# Patient Record
Sex: FEMALE | Race: BLACK OR AFRICAN AMERICAN | NOT HISPANIC OR LATINO | URBAN - METROPOLITAN AREA
[De-identification: names, ages, dates, MRNs, and addresses within clinical notes are randomized per-mention and may not be internally consistent; named-entity substitution may affect disease eponyms.]

---

## 2024-07-22 ENCOUNTER — EMERGENCY (EMERGENCY)
Facility: HOSPITAL | Age: 24
LOS: 0 days | Discharge: HOME | End: 2024-07-22
Attending: STUDENT IN AN ORGANIZED HEALTH CARE EDUCATION/TRAINING PROGRAM
Payer: COMMERCIAL

## 2024-07-22 VITALS
OXYGEN SATURATION: 98 % | TEMPERATURE: 99 F | HEART RATE: 69 BPM | WEIGHT: 149.91 LBS | DIASTOLIC BLOOD PRESSURE: 75 MMHG | SYSTOLIC BLOOD PRESSURE: 116 MMHG | RESPIRATION RATE: 18 BRPM | HEIGHT: 67 IN

## 2024-07-22 DIAGNOSIS — O99.891 OTHER SPECIFIED DISEASES AND CONDITIONS COMPLICATING PREGNANCY: ICD-10-CM

## 2024-07-22 DIAGNOSIS — O21.9 VOMITING OF PREGNANCY, UNSPECIFIED: ICD-10-CM

## 2024-07-22 DIAGNOSIS — J45.909 UNSPECIFIED ASTHMA, UNCOMPLICATED: ICD-10-CM

## 2024-07-22 DIAGNOSIS — R10.32 LEFT LOWER QUADRANT PAIN: ICD-10-CM

## 2024-07-22 DIAGNOSIS — O23.41 UNSPECIFIED INFECTION OF URINARY TRACT IN PREGNANCY, FIRST TRIMESTER: ICD-10-CM

## 2024-07-22 DIAGNOSIS — Z3A.01 LESS THAN 8 WEEKS GESTATION OF PREGNANCY: ICD-10-CM

## 2024-07-22 LAB
ALBUMIN SERPL ELPH-MCNC: 5.1 G/DL — SIGNIFICANT CHANGE UP (ref 3.5–5.2)
ALP SERPL-CCNC: 60 U/L — SIGNIFICANT CHANGE UP (ref 30–115)
ALT FLD-CCNC: 9 U/L — SIGNIFICANT CHANGE UP (ref 0–41)
ANION GAP SERPL CALC-SCNC: 12 MMOL/L — SIGNIFICANT CHANGE UP (ref 7–14)
APPEARANCE UR: ABNORMAL
AST SERPL-CCNC: 15 U/L — SIGNIFICANT CHANGE UP (ref 0–41)
BASOPHILS # BLD AUTO: 0.06 K/UL — SIGNIFICANT CHANGE UP (ref 0–0.2)
BASOPHILS NFR BLD AUTO: 0.5 % — SIGNIFICANT CHANGE UP (ref 0–1)
BILIRUB SERPL-MCNC: 1.1 MG/DL — SIGNIFICANT CHANGE UP (ref 0.2–1.2)
BILIRUB UR-MCNC: NEGATIVE — SIGNIFICANT CHANGE UP
BLD GP AB SCN SERPL QL: SIGNIFICANT CHANGE UP
BUN SERPL-MCNC: 8 MG/DL — LOW (ref 10–20)
CALCIUM SERPL-MCNC: 9.6 MG/DL — SIGNIFICANT CHANGE UP (ref 8.4–10.5)
CHLORIDE SERPL-SCNC: 102 MMOL/L — SIGNIFICANT CHANGE UP (ref 98–110)
CO2 SERPL-SCNC: 23 MMOL/L — SIGNIFICANT CHANGE UP (ref 17–32)
COLOR SPEC: YELLOW — SIGNIFICANT CHANGE UP
CREAT SERPL-MCNC: 0.8 MG/DL — SIGNIFICANT CHANGE UP (ref 0.7–1.5)
DIFF PNL FLD: ABNORMAL
EGFR: 105 ML/MIN/1.73M2 — SIGNIFICANT CHANGE UP
EOSINOPHIL # BLD AUTO: 0.06 K/UL — SIGNIFICANT CHANGE UP (ref 0–0.7)
EOSINOPHIL NFR BLD AUTO: 0.5 % — SIGNIFICANT CHANGE UP (ref 0–8)
GLUCOSE SERPL-MCNC: 86 MG/DL — SIGNIFICANT CHANGE UP (ref 70–99)
GLUCOSE UR QL: NEGATIVE MG/DL — SIGNIFICANT CHANGE UP
HCG SERPL-ACNC: 270.2 MIU/ML — HIGH
HCT VFR BLD CALC: 42.7 % — SIGNIFICANT CHANGE UP (ref 37–47)
HGB BLD-MCNC: 14.1 G/DL — SIGNIFICANT CHANGE UP (ref 12–16)
IMM GRANULOCYTES NFR BLD AUTO: 0.3 % — SIGNIFICANT CHANGE UP (ref 0.1–0.3)
KETONES UR-MCNC: ABNORMAL MG/DL
LEUKOCYTE ESTERASE UR-ACNC: ABNORMAL
LIDOCAIN IGE QN: 26 U/L — SIGNIFICANT CHANGE UP (ref 7–60)
LYMPHOCYTES # BLD AUTO: 18.8 % — LOW (ref 20.5–51.1)
LYMPHOCYTES # BLD AUTO: 2.08 K/UL — SIGNIFICANT CHANGE UP (ref 1.2–3.4)
MCHC RBC-ENTMCNC: 30.5 PG — SIGNIFICANT CHANGE UP (ref 27–31)
MCHC RBC-ENTMCNC: 33 G/DL — SIGNIFICANT CHANGE UP (ref 32–37)
MCV RBC AUTO: 92.2 FL — SIGNIFICANT CHANGE UP (ref 81–99)
MONOCYTES # BLD AUTO: 0.52 K/UL — SIGNIFICANT CHANGE UP (ref 0.1–0.6)
MONOCYTES NFR BLD AUTO: 4.7 % — SIGNIFICANT CHANGE UP (ref 1.7–9.3)
NEUTROPHILS # BLD AUTO: 8.33 K/UL — HIGH (ref 1.4–6.5)
NEUTROPHILS NFR BLD AUTO: 75.2 % — SIGNIFICANT CHANGE UP (ref 42.2–75.2)
NITRITE UR-MCNC: POSITIVE
NRBC # BLD: 0 /100 WBCS — SIGNIFICANT CHANGE UP (ref 0–0)
PH UR: 6.5 — SIGNIFICANT CHANGE UP (ref 5–8)
PLATELET # BLD AUTO: 244 K/UL — SIGNIFICANT CHANGE UP (ref 130–400)
PMV BLD: 11.2 FL — HIGH (ref 7.4–10.4)
POTASSIUM SERPL-MCNC: 3.8 MMOL/L — SIGNIFICANT CHANGE UP (ref 3.5–5)
POTASSIUM SERPL-SCNC: 3.8 MMOL/L — SIGNIFICANT CHANGE UP (ref 3.5–5)
PROT SERPL-MCNC: 7.4 G/DL — SIGNIFICANT CHANGE UP (ref 6–8)
PROT UR-MCNC: SIGNIFICANT CHANGE UP MG/DL
RBC # BLD: 4.63 M/UL — SIGNIFICANT CHANGE UP (ref 4.2–5.4)
RBC # FLD: 12.8 % — SIGNIFICANT CHANGE UP (ref 11.5–14.5)
SODIUM SERPL-SCNC: 137 MMOL/L — SIGNIFICANT CHANGE UP (ref 135–146)
SP GR SPEC: 1.02 — SIGNIFICANT CHANGE UP (ref 1–1.03)
UROBILINOGEN FLD QL: 1 MG/DL — SIGNIFICANT CHANGE UP (ref 0.2–1)
WBC # BLD: 11.08 K/UL — HIGH (ref 4.8–10.8)
WBC # FLD AUTO: 11.08 K/UL — HIGH (ref 4.8–10.8)

## 2024-07-22 PROCEDURE — 84702 CHORIONIC GONADOTROPIN TEST: CPT

## 2024-07-22 PROCEDURE — 87186 SC STD MICRODIL/AGAR DIL: CPT

## 2024-07-22 PROCEDURE — 99284 EMERGENCY DEPT VISIT MOD MDM: CPT | Mod: 25

## 2024-07-22 PROCEDURE — 83690 ASSAY OF LIPASE: CPT

## 2024-07-22 PROCEDURE — 76830 TRANSVAGINAL US NON-OB: CPT | Mod: 26

## 2024-07-22 PROCEDURE — 87086 URINE CULTURE/COLONY COUNT: CPT

## 2024-07-22 PROCEDURE — 76830 TRANSVAGINAL US NON-OB: CPT

## 2024-07-22 PROCEDURE — 36000 PLACE NEEDLE IN VEIN: CPT

## 2024-07-22 PROCEDURE — 85025 COMPLETE CBC W/AUTO DIFF WBC: CPT

## 2024-07-22 PROCEDURE — 86900 BLOOD TYPING SEROLOGIC ABO: CPT

## 2024-07-22 PROCEDURE — 86850 RBC ANTIBODY SCREEN: CPT

## 2024-07-22 PROCEDURE — 86901 BLOOD TYPING SEROLOGIC RH(D): CPT

## 2024-07-22 PROCEDURE — 80053 COMPREHEN METABOLIC PANEL: CPT

## 2024-07-22 PROCEDURE — 36415 COLL VENOUS BLD VENIPUNCTURE: CPT

## 2024-07-22 PROCEDURE — 99285 EMERGENCY DEPT VISIT HI MDM: CPT

## 2024-07-22 PROCEDURE — 81001 URINALYSIS AUTO W/SCOPE: CPT

## 2024-07-22 RX ORDER — CEPHALEXIN 500 MG
1 CAPSULE ORAL
Qty: 20 | Refills: 0
Start: 2024-07-22 | End: 2024-07-31

## 2024-07-22 NOTE — ED PROVIDER NOTE - CARE PLAN
Principal Discharge DX:	Acute UTI   1 Principal Discharge DX:	Acute UTI  Assessment and plan of treatment:	plan- labs tvus ua

## 2024-07-22 NOTE — CONSULT NOTE ADULT - ASSESSMENT
24y  at 5w0d with likely early IUP, PUL, clinically stable and asmptomatic.    -ectopic and bleeding precautions discussed  -advised repeat hCG in 48hrs, paper scripts given  -patient reports she would not like to proceed with pregnancy and desires termination, advised follow up at [facility] for furhter care  -dispo per ED

## 2024-07-22 NOTE — ED PROVIDER NOTE - NSFOLLOWUPCLINICS_GEN_ALL_ED_FT
[facility]  OB/GYN  [address] [place] [address]  Phone: [phone]  Fax:

## 2024-07-22 NOTE — ED PROVIDER NOTE - PHYSICAL EXAMINATION
VITAL SIGNS: I have reviewed nursing notes and confirm.  CONSTITUTIONAL: Well-developed; well-nourished; in no acute distress.  SKIN: Skin exam is warm and dry, no acute rash.  HEAD: Normocephalic; atraumatic.  CARD: S1, S2 normal; no murmurs, gallops, or rubs. Regular rate and rhythm.  RESP: Normal respiratory effort, no tachypnea or distress. Lungs CTAB, no wheezes, rales or rhonchi.  ABD: soft, tender suprapubic region and RUQ  NEURO: Alert, oriented. Grossly unremarkable. No focal deficits.  PSYCH: Cooperative, appropriate.

## 2024-07-22 NOTE — ED ADULT TRIAGE NOTE - CHIEF COMPLAINT QUOTE
pt c/o cramps and urinary frequency x2 weeks  denies vaginal bleeding   states she is 5 weeks preg  LMP 6/17

## 2024-07-22 NOTE — ED PROVIDER NOTE - NSFOLLOWUPINSTRUCTIONS_ED_ALL_ED_FT
Our Emergency Department Referral Coordinators will be reaching out to you in the next 24-48 hours from 9:00am to 5:00pm to schedule a follow up appointment. Please expect a phone call from the hospital in that time frame. If you do not receive a call or if you have any questions or concerns, you can reach them at   [phone].    Pregnancy and Urinary Tract Infection  The female urinary system, including the kidneys, ureters, bladder, and urethra.  A urinary tract infection (UTI) is an infection of any part of the urinary tract. This includes the kidneys, the tubes that connect the kidneys to the bladder (ureters), the bladder, and the tube that carries urine out of the body (urethra). These organs make, store, and get rid of urine in the body. Your health care provider may use other names to describe the infection. An upper UTI affects the ureters and kidneys (pyelonephritis). A lower UTI affects the bladder (cystitis) and urethra (urethritis).    Most UTIs are caused by bacteria in the genital area, around the entrance to the urinary tract. These bacteria grow and cause irritation and inflammation of the urinary tract. You are more likely to develop a UTI during pregnancy because:  The physical and hormonal changes that your body goes through make it easier for bacteria to get into your urinary tract.  Your growing baby puts pressure on your bladder and can affect urine flow.  Pregnant women with diabetes are at an increased risk for developing a UTI. It is important to recognize and treat UTIs in pregnancy because they can cause serious complications for both you and your baby.    How does this affect me?  Symptoms of a UTI include:  Needing to urinate right away (urgently) and often, even if urinating a small amount.  Pain, burning, or having a hard time passing urine.  Blood in the urine.  Unusual, cloudy, and bad-smelling urine.  Pain in the abdomen or lower back.  Vaginal discharge.  You may also have:  Vomiting or a decreased appetite.  Confusion.  Irritability or tiredness.  A fever.  Diarrhea.  A low level of red blood cells (anemia).  The development of high blood pressure during pregnancy (preeclampsia).  How does this affect my baby?  An untreated UTI during pregnancy could lead to a kidney infection or an infection throughout the mother's body (systemic infection). This can cause health problems and affect the baby. Possible complications of an untreated UTI include:  Your baby being born before 37 weeks of pregnancy (premature).  Your baby being born with a low birth weight.  Your baby having a higher risk of having his or her skin or the white parts of the eyes turn yellow (jaundice).  What can I do to lower my risk?  To prevent a UTI:  Do not hold urine for long periods of time. Empty your bladder as soon as you feel the urge.  Always wipe from front to back, especially after a bowel movement. Use each tissue one time when you wipe.  Empty your bladder after sex.  Keep your genital area dry.  Drink 6 to 8 glasses of water each day.  Do not douche or use deodorant sprays.  Wear cotton underwear and loose clothing.  How is this treated?  Treatment for this condition may include:  Antibiotic medicines that are safe to take during pregnancy.  Other medicines to treat less common causes of UTI.  Follow these instructions at home:  If you were prescribed an antibiotic medicine, take it as told by your health care provider. Do not stop using the antibiotic even if you start to feel better.  Keep all follow-up visits. This is important.  Contact a health care provider if:  Your symptoms do not improve or they get worse.  You have abnormal vaginal discharge.  Get help right away if you:  Have a fever.  Have nausea and vomiting.  Have back or side pain.  Have lower belly pain, tightness, or feel contractions in your uterus.  Have a gush of fluid from your vagina.  Have blood in your urine.  Summary  A UTI is an infection of any part of the urinary tract, which includes the kidneys, ureters, bladder, and urethra.  Most urinary tract infections are caused by bacteria in your genital area, around the entrance to your urinary tract (urethra).  You are more likely to develop a UTI during pregnancy. It is important to recognize and treat UTIs in pregnancy because of the risk of serious complications for both you and your baby.  If you were prescribed an antibiotic medicine, take it as told by your health care provider. Do not stop using the antibiotic even if you start to feel better.  This information is not intended to replace advice given to you by your health care provider. Make sure you discuss any questions you have with your health care provider.

## 2024-07-22 NOTE — ED PROVIDER NOTE - ATTENDING CONTRIBUTION TO CARE
25 yo F PMHx miscarriages, asthma presents to ED for eval of RLQ abdominal pain for 3 days, currently 5 weeks pregnant, reports urinary frequency. No fevers. LMP 6/17/24.     CONSTITUTIONAL: NAD.   SKIN: warm, dry  HEAD: Normocephalic; atraumatic.  ENT: MMM.   NECK: Supple.  CARD: RRR.   RESP: No wheezes, rales or rhonchi.  ABD: soft nondistended mild suprapubic tenderess no CVAT.   EXT: Normal ROM.  No lower extremity edema.   NEURO: Alert, oriented, grossly unremarkable.

## 2024-07-22 NOTE — ED PROVIDER NOTE - NSPTACCESSSVCSAPPTDETAILS_ED_ALL_ED_FT
assist to open containers. Pt reports difficulty with opening bags at home/set-up required 5w pregnant

## 2024-07-22 NOTE — CONSULT NOTE ADULT - SUBJECTIVE AND OBJECTIVE BOX
Chief Complaint: +hCG    HPI: 24y  at 5w0d by LMP  with + HCG. Denies abdominal pain or bleeding. This is an unplanned and undesired pregnancy. She does not see a GYN.     Ob/Gyn History:                   LMP -   SABx2 at "3 months" pregnancy  Denies history of ovarian cysts, uterine fibroids, abnormal paps, or STIs    Home Medications: denies    Allergies: NKDA    PAST MEDICAL & SURGICAL HISTORY:  Asthma    FAMILY HISTORY: denies pertinent hx      SOCIAL HISTORY: Denies cigarette use, alcohol use, or illicit drug use    Vital Signs Last 24 Hrs  T(F): 98.8 (2024 12:09), Max: 98.8 (2024 12:09)  HR: 69 (2024 12:09) (69 - 69)  BP: 116/75 (2024 12:09) (116/75 - 116/75)  RR: 18 (2024 12:09) (18 - 18)  Height (cm): 170.2 (24 @ 12:09)  Weight (kg): 68 (24 @ 12:09)  BMI (kg/m2): 23.5 (24 @ 12:09)  BSA (m2): 1.79 (24 @ 12:09)    General Appearance - AAOx3, NAD  Heart - S1S2 regular rate and rhythm  Lung - CTA Bilaterally  Abdomen - Soft, nontender, nondistended, no rebound, no rigidity, no guarding, bowel sounds present    GYN/Pelvis:    Labia Majora - Normal  Labia Minora - Normal  Clitoris - Normal  Urethra - Normal  Vagina - Normal  Cervix - Normal    Uterus:  Size - Normal  Tenderness - None  Mass - None  Freely mobile    Adnexa:  Masses - None  Tenderness - None      Meds:     Height (cm): 170.2 (24 @ 12:09)  Weight (kg): 68 (24 @ 12:09)  BMI (kg/m2): 23.5 (24 @ 12:09)  BSA (m2): 1.79 (24 @ 12:09)    LABS:                        14.1   11.08 )-----------( 244      ( 2024 13:33 )             42.7     HCG Quantitative, Serum: 270.2 mIU/mL (24 @ 13:33)    ABO RH Interpretation: A POS [2024 16:10  SHABBIR  No historical record of blood group and Rh, requires a second sample for  retype prior to the release of any blood products.  For prenatal, a  second sample on next visit.] (24 @ 15:17)  Antibody Screen: NEG (24 @ 15:17)        137  |  102  |  8<L>  ----------------------------<  86  3.8   |  23  |  0.8    Ca    9.6      2024 13:33    TPro  7.4  /  Alb  5.1  /  TBili  1.1  /  DBili  x   /  AST  15  /  ALT  9   /  AlkPhos  60        Urinalysis Basic - ( 2024 13:33 )    Color: Yellow / Appearance: Cloudy / S.020 / pH: x  Gluc: 86 mg/dL / Ketone: Trace mg/dL  / Bili: Negative / Urobili: 1.0 mg/dL   Blood: x / Protein: Trace mg/dL / Nitrite: Positive   Leuk Esterase: Moderate / RBC: 2 /HPF / WBC 19 /HPF   Sq Epi: x / Non Sq Epi: 10 /HPF / Bacteria: Too Numerous to count /HPF          RADIOLOGY & ADDITIONAL STUDIES:  < from: US Transvaginal (24 @ 14:03) >  FINDINGS:  Uterus: 8.5 x 3.7 x 4.7 cm. Endometrium measures 14 mm in thickness. No   intrauterine gestational sac identified.      Right ovary: 3.2 x 1.6 x 2.3 cm. Vascular flow demonstrated to the ovary.  Left ovary: 3.6 x 2.2 x 2.8 cm. Vascular flow demonstrated to the ovary.   2.3 cm corpus luteum.    Fluid: Small    IMPRESSION:    No intrauterine gestational sac. In a pregnant patient, these findings   represent a pregnancy of unknown location.The sonographic differential   diagnosis includes: an intrauterine gestation too early to visualize, a   spontaneous , or an occult ectopic pregnancy. Continued close   clinical follow-up, serial serum beta-HCG levels and short term   sonographic follow up is recommended    Small free pelvic fluid.    < end of copied text >

## 2024-07-22 NOTE — ED PROVIDER NOTE - DIFFERENTIAL DIAGNOSIS
The differential diagnosis for patients clinical presentation includes but is not limited to: pregnancy, uti, pyelo, miscarriage, ectopic Differential Diagnosis

## 2024-07-22 NOTE — ED PROVIDER NOTE - CLINICAL SUMMARY MEDICAL DECISION MAKING FREE TEXT BOX
25 yo F presented to ED for pregnancy eval with uti sx. Labs were ordered and reviewed.  Imaging was ordered and reviewed by me.  Appropriate medications for patient's presenting complaints were ordered and effects were reassessed.  Patient's records (prior hospital, ED visit, and/or nursing home notes if available) were reviewed.  Additional history was obtained from EMS, family, and/or PCP (where available).  Escalation to admission/observation was considered.   However patient feels much better and is comfortable with discharge.  Appropriate follow-up was arranged. Eval by obgyn in ED, will f/u for rpt beta hcg. Return precautions discussed in detail.

## 2024-07-22 NOTE — ED PROVIDER NOTE - PATIENT PORTAL LINK FT
You can access the FollowMyHealth Patient Portal offered by [facility] by registering at the following website: [email or web address]. By joining [facility]’s FollowMyHealth portal, you will also be able to view your health information using other applications (apps) compatible with our system.

## 2024-07-23 NOTE — CHART NOTE - NSCHARTNOTEFT_GEN_A_CORE
PGY2    Called patient to check in and remind [place] [facility] [place] completed [place]/24 at any [facility] lab. No response, VMx1 left with callback number provided.     Discussed with Dr. Adams.

## 2024-07-24 PROBLEM — J45.909 UNSPECIFIED ASTHMA, UNCOMPLICATED: Chronic | Status: ACTIVE | Noted: 2024-07-22

## 2024-07-24 NOTE — CHART NOTE - NSCHARTNOTEFT_GEN_A_CORE
PGY2    Called patient to check in and remind of [facility] lab work to be completed today. VMx1 left for patient. Able to contact emergency contact (older sister), Nia Conrad, who affirmed patient was working. Planning to go with her sister to the lab today at 1400. Instructed to go to any [facility] lab. Ectopic and bleeding precautions discussed. Will f/u results and contact patient.     Discussed with Dr. Adams.

## 2024-07-25 NOTE — CHART NOTE - NSCHARTNOTEFT_GEN_A_CORE
PGY2    Called patient to check in and discuss bhcg results. No response, VMx1 with callback instructions provided.     Discussed with Dr. Adams. PGY2    Called patient to check in and discuss bhcg results. No response, VMx1 with callback instructions provided.     Discussed with Dr. Adams.    Addendum: Patient returned phone call. Discussed appropriate bhcg rise. She is unsure about pregnancy at this time. Affirmed she will repeat bhcg 7/26, to discuss options counseling after subsequent bhcg.

## 2024-07-26 RX ORDER — NITROFURANTOIN MACROCRYSTAL 100 MG
1 CAPSULE ORAL
Qty: 14 | Refills: 0
Start: 2024-07-26 | End: 2024-08-01

## 2024-07-27 NOTE — CHART NOTE - NSCHARTNOTEFT_GEN_A_CORE
PGY2 Beta Note    Attempted to call pt to discuss appropriately rising HCG results and options counseling. Patient did not answer, VM x1 left and callback number provided.

## 2024-07-28 NOTE — CHART NOTE - NSCHARTNOTEFT_GEN_A_CORE
PGY2 Beta Note    Attempted to call pt to discuss appropriately rising HCG results and options counseling. Patient did not answer, VM x1 left and callback number provided. PGY2 Beta Note    Attempted to call pt to discuss appropriately rising HCG results and options counseling. Patient did not answer, VM x1 left and callback number provided.      ADDENDUM: Pt called back, not c/o abdominal pain or bleeding. Still desires TOP. Currently has appt with Dr. Carrizales for sono. Will reschedule for sono and TOP appt with Dr. Adams

## 2024-07-30 ENCOUNTER — APPOINTMENT (OUTPATIENT)
Dept: OBGYN | Facility: CLINIC | Age: 24
End: 2024-07-30

## 2024-08-01 ENCOUNTER — OUTPATIENT (OUTPATIENT)
Dept: OUTPATIENT SERVICES | Facility: HOSPITAL | Age: 24
LOS: 1 days | End: 2024-08-01
Payer: COMMERCIAL

## 2024-08-01 ENCOUNTER — APPOINTMENT (OUTPATIENT)
Dept: OBGYN | Facility: CLINIC | Age: 24
End: 2024-08-01

## 2024-08-01 VITALS
BODY MASS INDEX: 23.73 KG/M2 | SYSTOLIC BLOOD PRESSURE: 120 MMHG | WEIGHT: 153 LBS | DIASTOLIC BLOOD PRESSURE: 79 MMHG | OXYGEN SATURATION: 100 % | HEIGHT: 67.5 IN | HEART RATE: 80 BPM

## 2024-08-01 DIAGNOSIS — Z00.00 ENCOUNTER FOR GENERAL ADULT MEDICAL EXAMINATION W/OUT ABNORMAL FINDINGS: ICD-10-CM

## 2024-08-01 DIAGNOSIS — Z64.0 PROBLEMS RELATED TO UNWANTED PREGNANCY: ICD-10-CM

## 2024-08-01 PROCEDURE — 87661 TRICHOMONAS VAGINALIS AMPLIF: CPT

## 2024-08-01 PROCEDURE — 99205 OFFICE O/P NEW HI 60 MIN: CPT

## 2024-08-01 PROCEDURE — 76815 OB US LIMITED FETUS(S): CPT | Mod: 26

## 2024-08-01 PROCEDURE — 87491 CHLMYD TRACH DNA AMP PROBE: CPT

## 2024-08-01 PROCEDURE — 99215 OFFICE O/P EST HI 40 MIN: CPT

## 2024-08-01 PROCEDURE — 87591 N.GONORRHOEAE DNA AMP PROB: CPT

## 2024-08-01 RX ORDER — MISOPROSTOL 200 UG/1
200 TABLET ORAL
Qty: 4 | Refills: 0 | Status: ACTIVE | COMMUNITY
Start: 2024-08-01 | End: 1900-01-01

## 2024-08-01 RX ORDER — PROMETHAZINE HYDROCHLORIDE 25 MG/1
25 TABLET ORAL EVERY 6 HOURS
Qty: 5 | Refills: 0 | Status: ACTIVE | COMMUNITY
Start: 2024-08-01 | End: 1900-01-01

## 2024-08-01 RX ORDER — IBUPROFEN 800 MG/1
800 TABLET, FILM COATED ORAL EVERY 8 HOURS
Qty: 15 | Refills: 0 | Status: ACTIVE | COMMUNITY
Start: 2024-08-01 | End: 1900-01-01

## 2024-08-01 NOTE — CHART NOTE - NSCHARTNOTEFT_GEN_A_CORE
PGY2    HPI from ED visit 24y  at 5w0d by LMP  with + HCG. Denies abdominal pain or bleeding. This is an unplanned and undesired pregnancy. She does not see a GYN.     Ob/Gyn History:                   LMP -   SABx2 at "3 months" pregnancy  Denies history of ovarian cysts, uterine fibroids, abnormal paps, or STIs    Labs showed: LABS:                        14.1   11.08 )-----------( 244      ( 2024 13:33 )             42.7     HCG Quantitative, Serum: 270.2 mIU/mL (24 @ 13:33)    ABO RH Interpretation: A POS [2024 16:10  SHABBIR  No historical record of blood group and Rh, requires a second sample for  retype prior to the release of any blood products.  For prenatal, a  second sample on next visit.] (24 @ 15:17)  Antibody Screen: NEG (24 @ 15:17)        137  |  102  |  8<L>  ----------------------------<  86  3.8   |  23  |  0.8    Ca    9.6      2024 13:33    TPro  7.4  /  Alb  5.1  /  TBili  1.1  /  DBili  x   /  AST  15  /  ALT  9   /  AlkPhos  60        Urinalysis Basic - ( 2024 13:33 )    Color: Yellow / Appearance: Cloudy / S.020 / pH: x  Gluc: 86 mg/dL / Ketone: Trace mg/dL  / Bili: Negative / Urobili: 1.0 mg/dL   Blood: x / Protein: Trace mg/dL / Nitrite: Positive   Leuk Esterase: Moderate / RBC: 2 /HPF / WBC 19 /HPF   Sq Epi: x / Non Sq Epi: 10 /HPF / Bacteria: Too Numerous to count /HPF    RADIOLOGY & ADDITIONAL STUDIES:  < from: US Transvaginal (24 @ 14:03) >  FINDINGS:  Uterus: 8.5 x 3.7 x 4.7 cm. Endometrium measures 14 mm in thickness. No   intrauterine gestational sac identified.      Right ovary: 3.2 x 1.6 x 2.3 cm. Vascular flow demonstrated to the ovary.  Left ovary: 3.6 x 2.2 x 2.8 cm. Vascular flow demonstrated to the ovary.   2.3 cm corpus luteum.    Fluid: Small    IMPRESSION:    No intrauterine gestational sac. In a pregnant patient, these findings   represent a pregnancy of unknown location.The sonographic differential   diagnosis includes: an intrauterine gestation too early to visualize, a   spontaneous , or an occult ectopic pregnancy. Continued close   clinical follow-up, serial serum beta-HCG levels and short term   sonographic follow up is recommended    Small free pelvic fluid.    < end of copied text >    She was assessed as: 24y  at 5w0d with likely early IUP, PUL, clinically stable and asmptomatic.    Instructed to obtained serial b-hCGs. +SIUP, +GS, +YS visualized on TVUS . S/p medication TOP consult.

## 2024-08-01 NOTE — CHART NOTE - NSCHARTNOTESELECT_GEN_ALL_CORE
Event Note
non clinical appointment info/Event Note
Event Note

## 2024-08-02 LAB
SOURCE AMPLIFICATION: NORMAL
T VAGINALIS RRNA SPEC QL NAA+PROBE: NOT DETECTED

## 2024-08-02 NOTE — COUNSELING
[Contraception/ Emergency Contraception/ Safe Sexual Practices] : contraception, emergency contraception, safe sexual practices [Pregnancy Options] : pregnancy options [Confidentiality] : confidentiality [STD (testing, results, tx)] : STD (testing, results, tx) [Medication Management] : medication management

## 2024-08-02 NOTE — PHYSICAL EXAM
[Chaperone Present] : A chaperone was present in the examining room during all aspects of the physical examination [Appropriately responsive] : appropriately responsive [Alert] : alert [No Acute Distress] : no acute distress [Oriented x3] : oriented x3 [Normal] : normal external genitalia

## 2024-08-04 LAB
C TRACH RRNA SPEC QL NAA+PROBE: NOT DETECTED
N GONORRHOEA RRNA SPEC QL NAA+PROBE: NOT DETECTED
SOURCE AMPLIFICATION: NORMAL

## 2024-08-06 NOTE — PLAN
[FreeTextEntry1] :  ASSESSMENT  Patients is a 23 y/o  at approx 5w GA by TVUS not c/w LMP who presents for medication  for pregnancy termination.  Options Counseling: The patient was counseled about her pregnancy options of parenthood, adoption and . She expressed her sure decision for pregnancy termination.  The patient was counseled on medication and procedural  procedures and wishes to proceed with medication termination of pregnancy.   Risk of medication , including but not limited to, infection, retained products of conception, continuing pregnancy, hemorrhage, need for surgical  or D&C for incomplete  (~5%), potential for teratogenesis in this pregnancy if treatment unsuccessful, and death discussed. Discussed side effects, warning symptoms and pain management. Patient agreement forms were signed after discussion of risks and benefits of all management options.   PLAN  #One dose medication  protocol reviewed in detail.  Mifepristone 200 mg (See MA note) was administered orally in the clinic today after counseling and review of consent forms.  They were given prescriptions for ibuprofen 800 mg for pain as well as promethazine for nausea. They were advised to take a dose of promethazine with their first dose of ibuprofen (before or at the time of taking misoprostol) to potentially help with pain.  Extensive bleeding and infection precautions were reviewed and written instructions were given to her. Emergency contact information was provided.  #Follow up: 1-2 weeks for in person visit, ultrasound  #Labs - 2024 h/h 14.1/42.7  #Contraception and Future Pregnancy Planning For contraception, patient considering OCPs. To be discussed further at f/u visit.   #STI screening: Patient was offered screening for sexually transmitted infections. GCCT, trichomonas done today.

## 2024-08-06 NOTE — HISTORY OF PRESENT ILLNESS
[FreeTextEntry1] :  REASON FOR VISIT: Termination of Pregnancy   HISTORY OF PRESENT ILLNESS   Patient is a 23yo  who presents for initial consultation for pregnancy termination.   Duration: LMP 24 [ ] Patient has not yet had an ultrasound during this pregnancy [x] Patient had an ultrasound during this pregnancy -  at [facility] ED   Patient reports they care currently experiencing the following pregnancy symptoms: [ ] none [x] nausea [ ] abdominal/pelvic pain [x] breast soreness [ ] vaginal bleeding [ ] Other:   The patient has told partner about the pregnancy and  decision and has good support.  The patient is firm in their decision to proceed with termination. No one has pressured them into this decision.    __________________________________________________________________   MEDICATION  ELIGIBILITY SCREEN   Requirements: (Any "no" answers means not eligible)    IUP with gestational age < 77 days (11 weeks): Yes Willing to undergo surgical  if medical  fails: Yes Has an adult support person available after misoprostol administration: Yes Has access to a phone at all times: Yes Able and agrees to follow up plan: Yes   Exclusions: (Any "yes" answer means may not be eligible)   Known allergy to mifepristone/misoprostol: No Current anticoagulant therapy: No Personal history of clotting disorder: No Current use of oral corticosteroids: No Chronic adrenal failure: No Personal history of porphyria: No IUD in place: No Anemia (hemoglobin = 9.5 g/dL) or symptoms/risk factors for anemia: No Heart disease with impaired function requiring medications: No Known large fibroid uterus: No Other serious medical problem: No __________________________________________________________________   OBSTETRIC HISTORY:  23yo , sAb x2 Complications of prior pregnancies: History of Prior Transfusions: [x] No   GYNECOLOGIC HISTORY Menses:[x] Regular  [ ] Irregular -   Flow moderate Cramps: mild History of STIs?: Denies History of fibroids?: Denies History of Abnormal paps?: Never Primary Gyn for routine HCM: None   CONTRACEPTIVE HISTORY Prior methods used:    MEDICAL HISTORY: [x] Asthma - h/o multiple hospitalizations (at least once per year). Uses Singulair daily and albuterol 2-3x per week. Steroid use occasionally for asthma flares. No h/o intubations [ ] HTN [ ] h/o VTE [ ] recent hospitalizations [ ] Denies all PMH   SURGICAL HISTORY: Denies  MEDICATIONS: Singulair daily, albuterol PRN ALLERGIES: NKDA   SOCIAL HISTORY: Patient lives with : partner Employment: works in a physical laboring job Feels safe at home?: Yes   OFFICE ULTRASOUND:   Transabdominal, Transvaginal Number of gestations: 1  Patient was asked if she wanted to view the screen - Did not want to view the screen. Was patient asked whether she would like to know if multiples?: Yes, was unsure if she wanted to know.   + SIUP  Gestational sac: Present Yolk sac: Present  No concerning adnexal masses, no cardiac activity   Final gestational age: approx 5w GA by TVUS, (LMP 6w3d)     Reproductive life goals and contraception: We discussed reproductive life planning:   [x] The patient wishes to delay pregnancy for several years. [ ] The patient wishes to delay pregnancy for at least a year. [ ] The patient never wants to be pregnant again.  Considering OCPs. Would like to discuss further at f/u visit.

## 2024-08-06 NOTE — HISTORY OF PRESENT ILLNESS
[FreeTextEntry1] :  REASON FOR VISIT: Termination of Pregnancy   HISTORY OF PRESENT ILLNESS   Patient is a 23yo  who presents for initial consultation for pregnancy termination.   Duration: LMP 24 [ ] Patient has not yet had an ultrasound during this pregnancy [x] Patient had an ultrasound during this pregnancy -  at [facility] ED   Patient reports they care currently experiencing the following pregnancy symptoms: [ ] none [x] nausea [ ] abdominal/pelvic pain [x] breast soreness [ ] vaginal bleeding [ ] Other:   The patient has told partner about the pregnancy and  decision and has good support.  The patient is firm in their decision to proceed with termination. No one has pressured them into this decision.    __________________________________________________________________   MEDICATION  ELIGIBILITY SCREEN   Requirements: (Any "no" answers means not eligible)    IUP with gestational age < 77 days (11 weeks): Yes Willing to undergo surgical  if medical  fails: Yes Has an adult support person available after misoprostol administration: Yes Has access to a phone at all times: Yes Able and agrees to follow up plan: Yes   Exclusions: (Any "yes" answer means may not be eligible)   Known allergy to mifepristone/misoprostol: No Current anticoagulant therapy: No Personal history of clotting disorder: No Current use of oral corticosteroids: No Chronic adrenal failure: No Personal history of porphyria: No IUD in place: No Anemia (hemoglobin = 9.5 g/dL) or symptoms/risk factors for anemia: No Heart disease with impaired function requiring medications: No Known large fibroid uterus: No Other serious medical problem: No __________________________________________________________________   OBSTETRIC HISTORY:  23yo , sAb x2 Complications of prior pregnancies: History of Prior Transfusions: [x] No   GYNECOLOGIC HISTORY Menses:[x] Regular  [ ] Irregular -   Flow moderate Cramps: mild History of STIs?: Denies History of fibroids?: Denies History of Abnormal paps?: Never Primary Gyn for routine HCM: None   CONTRACEPTIVE HISTORY Prior methods used:    MEDICAL HISTORY: [x] Asthma - h/o multiple hospitalizations (at least once per year). Uses Singulair daily and albuterol 2-3x per week. Steroid use occasionally for asthma flares. No h/o intubations [ ] HTN [ ] h/o VTE [ ] recent hospitalizations [ ] Denies all PMH   SURGICAL HISTORY: Denies  MEDICATIONS: Singulair daily, albuterol PRN ALLERGIES: NKDA   SOCIAL HISTORY: Patient lives with : partner Employment: works in a physical laboring job Feels safe at home?: Yes   OFFICE ULTRASOUND:   Transabdominal, Transvaginal Number of gestations: 1  Patient was asked if she wanted to view the screen - Did not want to view the screen. Was patient asked whether she would like to know if multiples?: Yes, was unsure if she wanted to know.   + SIUP  Gestational sac: Present Yolk sac: Present  No concerning adnexal masses, no cardiac activity   Final gestational age: approx 5w GA by TVUS, (LMP 6w3d)     Reproductive life goals and contraception: We discussed reproductive life planning:   [x] The patient wishes to delay pregnancy for several years. [ ] The patient wishes to delay pregnancy for at least a year. [ ] The patient never wants to be pregnant again.  Considering OCPs. Would like to discuss further at f/u visit.

## 2024-08-06 NOTE — HISTORY OF PRESENT ILLNESS
[FreeTextEntry1] :  REASON FOR VISIT: Termination of Pregnancy   HISTORY OF PRESENT ILLNESS   Patient is a 25yo  who presents for initial consultation for pregnancy termination.   Duration: LMP 24 [ ] Patient has not yet had an ultrasound during this pregnancy [x] Patient had an ultrasound during this pregnancy -  at [facility] ED   Patient reports they care currently experiencing the following pregnancy symptoms: [ ] none [x] nausea [ ] abdominal/pelvic pain [x] breast soreness [ ] vaginal bleeding [ ] Other:   The patient has told partner about the pregnancy and  decision and has good support.  The patient is firm in their decision to proceed with termination. No one has pressured them into this decision.    __________________________________________________________________   MEDICATION  ELIGIBILITY SCREEN   Requirements: (Any "no" answers means not eligible)    IUP with gestational age < 77 days (11 weeks): Yes Willing to undergo surgical  if medical  fails: Yes Has an adult support person available after misoprostol administration: Yes Has access to a phone at all times: Yes Able and agrees to follow up plan: Yes   Exclusions: (Any "yes" answer means may not be eligible)   Known allergy to mifepristone/misoprostol: No Current anticoagulant therapy: No Personal history of clotting disorder: No Current use of oral corticosteroids: No Chronic adrenal failure: No Personal history of porphyria: No IUD in place: No Anemia (hemoglobin = 9.5 g/dL) or symptoms/risk factors for anemia: No Heart disease with impaired function requiring medications: No Known large fibroid uterus: No Other serious medical problem: No __________________________________________________________________   OBSTETRIC HISTORY:  25yo , sAb x2 Complications of prior pregnancies: History of Prior Transfusions: [x] No   GYNECOLOGIC HISTORY Menses:[x] Regular  [ ] Irregular -   Flow moderate Cramps: mild History of STIs?: Denies History of fibroids?: Denies History of Abnormal paps?: Never Primary Gyn for routine HCM: None   CONTRACEPTIVE HISTORY Prior methods used:    MEDICAL HISTORY: [x] Asthma - h/o multiple hospitalizations (at least once per year). Uses Singulair daily and albuterol 2-3x per week. Steroid use occasionally for asthma flares. No h/o intubations [ ] HTN [ ] h/o VTE [ ] recent hospitalizations [ ] Denies all PMH   SURGICAL HISTORY: Denies  MEDICATIONS: Singulair daily, albuterol PRN ALLERGIES: NKDA   SOCIAL HISTORY: Patient lives with : partner Employment: works in a physical laboring job Feels safe at home?: Yes   OFFICE ULTRASOUND:   Transabdominal, Transvaginal Number of gestations: 1  Patient was asked if she wanted to view the screen - Did not want to view the screen. Was patient asked whether she would like to know if multiples?: Yes, was unsure if she wanted to know.   + SIUP  Gestational sac: Present Yolk sac: Present  No concerning adnexal masses, no cardiac activity   Final gestational age: approx 5w GA by TVUS, (LMP 6w3d)     Reproductive life goals and contraception: We discussed reproductive life planning:   [x] The patient wishes to delay pregnancy for several years. [ ] The patient wishes to delay pregnancy for at least a year. [ ] The patient never wants to be pregnant again.  Considering OCPs. Would like to discuss further at f/u visit.

## 2024-08-06 NOTE — PLAN
[FreeTextEntry1] :  ASSESSMENT  Patients is a 25 y/o  at approx 5w GA by TVUS not c/w LMP who presents for medication  for pregnancy termination.  Options Counseling: The patient was counseled about her pregnancy options of parenthood, adoption and . She expressed her sure decision for pregnancy termination.  The patient was counseled on medication and procedural  procedures and wishes to proceed with medication termination of pregnancy.   Risk of medication , including but not limited to, infection, retained products of conception, continuing pregnancy, hemorrhage, need for surgical  or D&C for incomplete  (~5%), potential for teratogenesis in this pregnancy if treatment unsuccessful, and death discussed. Discussed side effects, warning symptoms and pain management. Patient agreement forms were signed after discussion of risks and benefits of all management options.   PLAN  #One dose medication  protocol reviewed in detail.  Mifepristone 200 mg (See MA note) was administered orally in the clinic today after counseling and review of consent forms.  They were given prescriptions for ibuprofen 800 mg for pain as well as promethazine for nausea. They were advised to take a dose of promethazine with their first dose of ibuprofen (before or at the time of taking misoprostol) to potentially help with pain.  Extensive bleeding and infection precautions were reviewed and written instructions were given to her. Emergency contact information was provided.  #Follow up: 1-2 weeks for in person visit, ultrasound  #Labs - 2024 h/h 14.1/42.7  #Contraception and Future Pregnancy Planning For contraception, patient considering OCPs. To be discussed further at f/u visit.   #STI screening: Patient was offered screening for sexually transmitted infections. GCCT, trichomonas done today.

## 2024-08-12 DIAGNOSIS — Z64.0 PROBLEMS RELATED TO UNWANTED PREGNANCY: ICD-10-CM

## 2024-08-15 ENCOUNTER — APPOINTMENT (OUTPATIENT)
Dept: OBGYN | Facility: CLINIC | Age: 24
End: 2024-08-15
Payer: COMMERCIAL

## 2024-08-15 VITALS
WEIGHT: 151.56 LBS | SYSTOLIC BLOOD PRESSURE: 121 MMHG | DIASTOLIC BLOOD PRESSURE: 78 MMHG | HEART RATE: 66 BPM | HEIGHT: 67.5 IN | OXYGEN SATURATION: 100 % | BODY MASS INDEX: 23.51 KG/M2

## 2024-08-15 DIAGNOSIS — Z51.89 ENCOUNTER FOR OTHER SPECIFIED AFTERCARE: ICD-10-CM

## 2024-08-15 PROCEDURE — 99214 OFFICE O/P EST MOD 30 MIN: CPT

## 2024-08-15 PROCEDURE — 76857 US EXAM PELVIC LIMITED: CPT | Mod: 26

## 2024-08-15 RX ORDER — LEVONORGESTREL 1.5 MG/1
1.5 TABLET ORAL
Qty: 1 | Refills: 0 | Status: ACTIVE | COMMUNITY
Start: 2024-08-15 | End: 1900-01-01

## 2024-08-15 RX ORDER — NORGESTREL AND ETHINYL ESTRADIOL 0.3-0.03MG
0.3-3 KIT ORAL DAILY
Qty: 3 | Refills: 3 | Status: ACTIVE | COMMUNITY
Start: 2024-08-15 | End: 1900-01-01

## 2024-08-18 NOTE — PLAN
[FreeTextEntry1] : TAUS: Thin endometrial stripe, no evidence of ongoing pregnancy or retained gestational sac   A/P: 25yo s/p medication , confirmed to be complete by history and exam.   social work consultation placed, declines speaking with  today, desires follow up phone call Therapy information via psychology today provided  emergency information provided to patient, emergency precautions reviewed   Contraceptive education reviewed, patient desires OCPs  Emergency contraception education reviewed: patient amenable to LNG pill prescription   RTC for annual exam, assess mood support, OCP satisfaction

## 2024-08-18 NOTE — HISTORY OF PRESENT ILLNESS
[FreeTextEntry1] : Patient presenting today for medication  follow up. She reports  taking misoprostol as prescribed, with cramping and bleeding after that was worse, then improved with ibuprofen after passage of small clots. She reports ongoing light spotting now, denies pain, denies fevers. She denies any signs or symptoms of pregnancy.   Patient became tearful during visit, and support was offered. She denies symptoms of depression, denies SI or HI but reports feeling "angry". She states she is traveling with her partner this weekend, and declines having concerns of violence of abuse, states her partner is not contributing to the stress but is receiving the burden of it. she does not feel these feeling are due to , but just due to overall life events.   Patient is ammenable to social work referral for support, and Psychology today information provided to patient.

## 2024-08-24 ENCOUNTER — INPATIENT (INPATIENT)
Facility: HOSPITAL | Age: 24
LOS: 3 days | Discharge: HOME | DRG: 690 | End: 2024-08-28
Attending: INTERNAL MEDICINE | Admitting: HOSPITALIST
Payer: COMMERCIAL

## 2024-08-24 VITALS
WEIGHT: 149.91 LBS | HEART RATE: 78 BPM | HEIGHT: 67 IN | TEMPERATURE: 99 F | DIASTOLIC BLOOD PRESSURE: 89 MMHG | RESPIRATION RATE: 16 BRPM | OXYGEN SATURATION: 99 % | SYSTOLIC BLOOD PRESSURE: 144 MMHG

## 2024-08-24 DIAGNOSIS — N39.0 URINARY TRACT INFECTION, SITE NOT SPECIFIED: ICD-10-CM

## 2024-08-24 LAB
ALBUMIN SERPL ELPH-MCNC: 4.7 G/DL — SIGNIFICANT CHANGE UP (ref 3.5–5.2)
ALP SERPL-CCNC: 65 U/L — SIGNIFICANT CHANGE UP (ref 30–115)
ALT FLD-CCNC: 16 U/L — SIGNIFICANT CHANGE UP (ref 0–41)
ANION GAP SERPL CALC-SCNC: 13 MMOL/L — SIGNIFICANT CHANGE UP (ref 7–14)
APPEARANCE UR: CLEAR — SIGNIFICANT CHANGE UP
AST SERPL-CCNC: 16 U/L — SIGNIFICANT CHANGE UP (ref 0–41)
BACTERIA # UR AUTO: ABNORMAL /HPF
BASOPHILS # BLD AUTO: 0.05 K/UL — SIGNIFICANT CHANGE UP (ref 0–0.2)
BASOPHILS NFR BLD AUTO: 0.6 % — SIGNIFICANT CHANGE UP (ref 0–1)
BILIRUB SERPL-MCNC: 0.9 MG/DL — SIGNIFICANT CHANGE UP (ref 0.2–1.2)
BILIRUB UR-MCNC: NEGATIVE — SIGNIFICANT CHANGE UP
BUN SERPL-MCNC: 5 MG/DL — LOW (ref 10–20)
CALCIUM SERPL-MCNC: 9.3 MG/DL — SIGNIFICANT CHANGE UP (ref 8.4–10.5)
CAST: 0 /LPF — SIGNIFICANT CHANGE UP (ref 0–4)
CHLORIDE SERPL-SCNC: 104 MMOL/L — SIGNIFICANT CHANGE UP (ref 98–110)
CO2 SERPL-SCNC: 22 MMOL/L — SIGNIFICANT CHANGE UP (ref 17–32)
COLOR SPEC: YELLOW — SIGNIFICANT CHANGE UP
CREAT SERPL-MCNC: 0.8 MG/DL — SIGNIFICANT CHANGE UP (ref 0.7–1.5)
DIFF PNL FLD: ABNORMAL
EGFR: 105 ML/MIN/1.73M2 — SIGNIFICANT CHANGE UP
EOSINOPHIL # BLD AUTO: 0.14 K/UL — SIGNIFICANT CHANGE UP (ref 0–0.7)
EOSINOPHIL NFR BLD AUTO: 1.7 % — SIGNIFICANT CHANGE UP (ref 0–8)
GLUCOSE SERPL-MCNC: 88 MG/DL — SIGNIFICANT CHANGE UP (ref 70–99)
GLUCOSE UR QL: NEGATIVE MG/DL — SIGNIFICANT CHANGE UP
HCG SERPL QL: NEGATIVE — SIGNIFICANT CHANGE UP
HCT VFR BLD CALC: 38.2 % — SIGNIFICANT CHANGE UP (ref 37–47)
HGB BLD-MCNC: 12.1 G/DL — SIGNIFICANT CHANGE UP (ref 12–16)
IMM GRANULOCYTES NFR BLD AUTO: 0.4 % — HIGH (ref 0.1–0.3)
KETONES UR-MCNC: ABNORMAL MG/DL
LEUKOCYTE ESTERASE UR-ACNC: ABNORMAL
LYMPHOCYTES # BLD AUTO: 1.25 K/UL — SIGNIFICANT CHANGE UP (ref 1.2–3.4)
LYMPHOCYTES # BLD AUTO: 14.7 % — LOW (ref 20.5–51.1)
MCHC RBC-ENTMCNC: 30.5 PG — SIGNIFICANT CHANGE UP (ref 27–31)
MCHC RBC-ENTMCNC: 31.7 G/DL — LOW (ref 32–37)
MCV RBC AUTO: 96.2 FL — SIGNIFICANT CHANGE UP (ref 81–99)
MONOCYTES # BLD AUTO: 0.56 K/UL — SIGNIFICANT CHANGE UP (ref 0.1–0.6)
MONOCYTES NFR BLD AUTO: 6.6 % — SIGNIFICANT CHANGE UP (ref 1.7–9.3)
NEUTROPHILS # BLD AUTO: 6.45 K/UL — SIGNIFICANT CHANGE UP (ref 1.4–6.5)
NEUTROPHILS NFR BLD AUTO: 76 % — HIGH (ref 42.2–75.2)
NITRITE UR-MCNC: NEGATIVE — SIGNIFICANT CHANGE UP
NRBC # BLD: 0 /100 WBCS — SIGNIFICANT CHANGE UP (ref 0–0)
PH UR: 8 — SIGNIFICANT CHANGE UP (ref 5–8)
PLATELET # BLD AUTO: 226 K/UL — SIGNIFICANT CHANGE UP (ref 130–400)
PMV BLD: 11.2 FL — HIGH (ref 7.4–10.4)
POTASSIUM SERPL-MCNC: 4.2 MMOL/L — SIGNIFICANT CHANGE UP (ref 3.5–5)
POTASSIUM SERPL-SCNC: 4.2 MMOL/L — SIGNIFICANT CHANGE UP (ref 3.5–5)
PROT SERPL-MCNC: 7.2 G/DL — SIGNIFICANT CHANGE UP (ref 6–8)
PROT UR-MCNC: 30 MG/DL
RBC # BLD: 3.97 M/UL — LOW (ref 4.2–5.4)
RBC # FLD: 13 % — SIGNIFICANT CHANGE UP (ref 11.5–14.5)
RBC CASTS # UR COMP ASSIST: 4 /HPF — SIGNIFICANT CHANGE UP (ref 0–4)
SODIUM SERPL-SCNC: 139 MMOL/L — SIGNIFICANT CHANGE UP (ref 135–146)
SP GR SPEC: 1.02 — SIGNIFICANT CHANGE UP (ref 1–1.03)
SQUAMOUS # UR AUTO: 1 /HPF — SIGNIFICANT CHANGE UP (ref 0–5)
UROBILINOGEN FLD QL: 1 MG/DL — SIGNIFICANT CHANGE UP (ref 0.2–1)
WBC # BLD: 8.48 K/UL — SIGNIFICANT CHANGE UP (ref 4.8–10.8)
WBC # FLD AUTO: 8.48 K/UL — SIGNIFICANT CHANGE UP (ref 4.8–10.8)
WBC UR QL: 47 /HPF — HIGH (ref 0–5)

## 2024-08-24 PROCEDURE — 85025 COMPLETE CBC W/AUTO DIFF WBC: CPT

## 2024-08-24 PROCEDURE — 87635 SARS-COV-2 COVID-19 AMP PRB: CPT

## 2024-08-24 PROCEDURE — 80053 COMPREHEN METABOLIC PANEL: CPT

## 2024-08-24 PROCEDURE — 99285 EMERGENCY DEPT VISIT HI MDM: CPT

## 2024-08-24 PROCEDURE — 71045 X-RAY EXAM CHEST 1 VIEW: CPT | Mod: 26

## 2024-08-24 PROCEDURE — 36415 COLL VENOUS BLD VENIPUNCTURE: CPT

## 2024-08-24 PROCEDURE — 84702 CHORIONIC GONADOTROPIN TEST: CPT

## 2024-08-24 PROCEDURE — 93005 ELECTROCARDIOGRAM TRACING: CPT

## 2024-08-24 PROCEDURE — 99223 1ST HOSP IP/OBS HIGH 75: CPT

## 2024-08-24 PROCEDURE — 85027 COMPLETE CBC AUTOMATED: CPT

## 2024-08-24 PROCEDURE — 83735 ASSAY OF MAGNESIUM: CPT

## 2024-08-24 RX ORDER — PIPERACILLIN SODIUM AND TAZOBACTAM SODIUM 3; .375 G/15ML; G/15ML
3.38 INJECTION, POWDER, FOR SOLUTION INTRAVENOUS ONCE
Refills: 0 | Status: COMPLETED | OUTPATIENT
Start: 2024-08-24 | End: 2024-08-24

## 2024-08-24 RX ORDER — SODIUM CHLORIDE 9 MG/ML
1000 INJECTION INTRAMUSCULAR; INTRAVENOUS; SUBCUTANEOUS ONCE
Refills: 0 | Status: COMPLETED | OUTPATIENT
Start: 2024-08-24 | End: 2024-08-24

## 2024-08-24 RX ORDER — KETOROLAC TROMETHAMINE 30 MG/ML
15 INJECTION, SOLUTION INTRAMUSCULAR ONCE
Refills: 0 | Status: DISCONTINUED | OUTPATIENT
Start: 2024-08-24 | End: 2024-08-24

## 2024-08-24 RX ORDER — SUCRALFATE 1 G/10ML
1 SUSPENSION ORAL
Refills: 0 | Status: DISCONTINUED | OUTPATIENT
Start: 2024-08-24 | End: 2024-08-28

## 2024-08-24 RX ORDER — MEROPENEM 500 MG/10ML
1000 INJECTION, POWDER, FOR SOLUTION INTRAVENOUS ONCE
Refills: 0 | Status: DISCONTINUED | OUTPATIENT
Start: 2024-08-24 | End: 2024-08-24

## 2024-08-24 RX ORDER — MEROPENEM 500 MG/10ML
1000 INJECTION, POWDER, FOR SOLUTION INTRAVENOUS EVERY 8 HOURS
Refills: 0 | Status: COMPLETED | OUTPATIENT
Start: 2024-08-24 | End: 2024-08-27

## 2024-08-24 RX ORDER — ONDANSETRON 2 MG/ML
4 INJECTION, SOLUTION INTRAMUSCULAR; INTRAVENOUS ONCE
Refills: 0 | Status: COMPLETED | OUTPATIENT
Start: 2024-08-24 | End: 2024-08-24

## 2024-08-24 RX ORDER — PANTOPRAZOLE SODIUM 40 MG
40 TABLET, DELAYED RELEASE (ENTERIC COATED) ORAL
Refills: 0 | Status: DISCONTINUED | OUTPATIENT
Start: 2024-08-24 | End: 2024-08-28

## 2024-08-24 RX ADMIN — ONDANSETRON 4 MILLIGRAM(S): 2 INJECTION, SOLUTION INTRAMUSCULAR; INTRAVENOUS at 12:01

## 2024-08-24 RX ADMIN — PIPERACILLIN SODIUM AND TAZOBACTAM SODIUM 200 GRAM(S): 3; .375 INJECTION, POWDER, FOR SOLUTION INTRAVENOUS at 14:34

## 2024-08-24 RX ADMIN — MEROPENEM 100 MILLIGRAM(S): 500 INJECTION, POWDER, FOR SOLUTION INTRAVENOUS at 21:50

## 2024-08-24 RX ADMIN — KETOROLAC TROMETHAMINE 15 MILLIGRAM(S): 30 INJECTION, SOLUTION INTRAMUSCULAR at 14:39

## 2024-08-24 RX ADMIN — SODIUM CHLORIDE 1000 MILLILITER(S): 9 INJECTION INTRAMUSCULAR; INTRAVENOUS; SUBCUTANEOUS at 12:01

## 2024-08-24 RX ADMIN — KETOROLAC TROMETHAMINE 15 MILLIGRAM(S): 30 INJECTION, SOLUTION INTRAMUSCULAR at 13:40

## 2024-08-24 NOTE — ED PROVIDER NOTE - PHYSICAL EXAMINATION
Gen: NAD, AOx3  Head: NCAT  HEENT: PERRL, oral mucosa moist, normal conjunctiva, oropharynx clear without exudate or erythema  Lung: CTAB, no respiratory distress, no wheezing, rales, rhonchi  CV: normal s1/s2, rrr, Normal perfusion, pulses 2+ throughout  Abd: soft, NTND, no CVA tenderness  Genitourinary: no pelvic tenderness, no edema/erythema/lesions, vaginal bleeding, no CMT/adnexal tenderness (chaperone Dr. Abraham)   MSK: No edema, no visible deformities, full range of motion in all 4 extremities  Neuro: CN II-XII grossly intact, No focal neurologic deficits  Skin: No rash   Psych: normal affect

## 2024-08-24 NOTE — H&P ADULT - ASSESSMENT
24 year old woman G3A3 with a past medical history of asthma presented for flu like symptoms over the past few days from urgent care. She tested Covid positive and was sent to ER. Patient reported having cough, headache and sore throat 3 days ago which prompted her to present to urgent care. She also had dysuria and frequent urination which she was tested for a UA and urine culture 2 days ago in the urgent care.    #Mild covid infection  - Not in distress. Only has flu like symptoms. Satting well on room air. PE unrevealing  - Isolation    #ESBL E.coli  #Simple UTI  - Patient test positive for ESBL E.coli. Still has dysuria and increased urinary frequency after taking microbid  - Start Meropenem 8 mg IV q8   - contact isolation  - ID clearance for meropenem     #Asthma  - On home rescue inhalers    #full code  #DVT prophylaxis: not indicated

## 2024-08-24 NOTE — ED PROVIDER NOTE - OBJECTIVE STATEMENT
23 yo female with a pmh of asthma presents with multiple complaints. Patient states to have experienced 2 days of n/v/weakness.  Patient states she tested positive for COVID yesterday at urgent care.  Patient also experiencing dysuria over the past 3 days.  Patient states to have had an  on , which is when she was initially diagnosed with UTI and feels like it never resolved.  Patient currently menstruating. pt denies any other symptoms including headache, recent illness/travel, cough, abdominal pain, chest pain, or SOB.

## 2024-08-24 NOTE — H&P ADULT - ATTENDING COMMENTS
24 year old woman G3A3 with a past medical history of asthma presented for flu like symptoms over the past few days from urgent care. She tested Covid positive and was sent to ER. Patient reported having cough, headache and sore throat 3 days ago which prompted her to present to urgent care. She also had dysuria and frequent urination which she was tested for a UA and urine culture 2 days ago in the urgent care.    Agree  with assessment  except for changes below.   Vital Signs Last 24 Hrs  T(C): 37.1 (24 Aug 2024 19:42), Max: 37.2 (24 Aug 2024 10:53)  T(F): 98.8 (24 Aug 2024 19:42), Max: 98.9 (24 Aug 2024 10:53)  HR: 61 (24 Aug 2024 19:42) (61 - 78)  BP: 106/67 (24 Aug 2024 19:42) (106/67 - 144/89)  BP(mean): --  RR: 18 (24 Aug 2024 19:42) (16 - 18)  SpO2: 97% (24 Aug 2024 19:42) (97% - 99%)    Parameters below as of 24 Aug 2024 19:42  Patient On (Oxygen Delivery Method): room air        IMPRESSION   Symptomatic  UTI   Hx ESBL E. Coli, Dysuria and increased urinary frequency after taking microbid  - Patient test positive for ESBL E.coli.   - Meropenem 8 mg IV q8   - contact isolation  - ID clearance for meropenem     Mild COVID infection  - Not in distress. Only has flu like symptoms. Satting well on room air.   - Isolation  - Tylenol     Hx Asthma Stable   - On home rescue inhalers 24 year old woman G3A3 with a past medical history of asthma presented for flu like symptoms over the past few days from urgent care. She tested Covid positive and was sent to ER. Patient reported having cough, headache and sore throat 3 days ago which prompted her to present to urgent care. She also had dysuria and frequent urination which she was tested for a UA and urine culture 2 days ago in the urgent care.    Agree  with assessment  except for changes below.   Vital Signs Last 24 Hrs  T(C): 37.1 (24 Aug 2024 19:42), Max: 37.2 (24 Aug 2024 10:53)  T(F): 98.8 (24 Aug 2024 19:42), Max: 98.9 (24 Aug 2024 10:53)  HR: 61 (24 Aug 2024 19:42) (61 - 78)  BP: 106/67 (24 Aug 2024 19:42) (106/67 - 144/89)  BP(mean): --  RR: 18 (24 Aug 2024 19:42) (16 - 18)  SpO2: 97% (24 Aug 2024 19:42) (97% - 99%)    Parameters below as of 24 Aug 2024 19:42  Patient On (Oxygen Delivery Method): room air    PHYSICAL EXAM  GENERAL: NAD,  HEAD:  NCAT, EOMI, MM  NECK: Supple, Nontender  NERVOUS SYSTEM:  AAOx3, NFD  CHEST/LUNG: +bs b/l, No wheezing   HEART: +s1s2 RRR  ABDOMEN: soft, NT/ND  EXTREMITIES:  pp, no edema  SKIN: age related skin changes       IMPRESSION   Symptomatic  UTI   Hx ESBL E. Coli, Dysuria and increased urinary frequency after taking microbid  - Patient test positive for ESBL E.coli.   - Meropenem 8 mg IV q8   - contact isolation  - ID clearance for meropenem     Episode of Bloody Emesis After Multiple Vomitting session  N/V resolved   Hgb Stable   Carafate, Pantoprazole   GI F/u        Mild COVID infection  - Not in distress. Only has flu like symptoms. Satting well on room air.   - Isolation  - Tylenol     Hx Asthma Stable   - On home rescue inhalers    Seen on 08/24

## 2024-08-24 NOTE — ED ADULT NURSE NOTE - NSFALLUNIVINTERV_ED_ALL_ED
Monitor gait and stability/Bed/Stretcher in lowest position, wheels locked, appropriate side rails in place/Call bell, personal items and telephone in reach/Instruct patient to call for assistance before getting out of bed/chair/stretcher/Non-slip footwear applied when patient is off stretcher/[place] to call system/Physically safe environment - no spills, clutter or unnecessary equipment/Purposeful proactive rounding/Room/bathroom lighting operational, light cord in reach

## 2024-08-24 NOTE — ED ADULT TRIAGE NOTE - CHIEF COMPLAINT QUOTE
pt came to ED for a UTI that has been "ongoing since July", states she is congested, also reports vomiting blood. states she tested positive for COVID at urgent care. pt reports urgent care prescribed her PO Flagyl, and that's when she began vomiting

## 2024-08-24 NOTE — H&P ADULT - NSHPPHYSICALEXAM_GEN_ALL_CORE
T(C): 36.7 (08-24-24 @ 15:46), Max: 37.2 (08-24-24 @ 10:53)  HR: 73 (08-24-24 @ 15:46) (73 - 78)  BP: 119/72 (08-24-24 @ 15:46) (119/72 - 144/89)  RR: 18 (08-24-24 @ 15:46) (16 - 18)  SpO2: 97% (08-24-24 @ 15:46) (97% - 99%)    CONSTITUTIONAL: Well groomed, no apparent distress  EYES: PERRLA and symmetric, EOMI, No conjunctival or scleral injection, non-icteric  ENMT: Oral mucosa with moist membranes. Normal dentition; no pharyngeal injection or exudates             NECK: Supple, symmetric and without tracheal deviation   RESP: No respiratory distress, no use of accessory muscles; CTA b/l, no WRR  CV: RRR, +S1S2, no MRG; no JVD; no peripheral edema  GI: Soft, NT, ND, no rebound, no guarding; no palpable masses; no hepatosplenomegaly; no hernia palpated  LYMPH: No cervical LAD or tenderness; no axillary LAD or tenderness; no inguinal LAD or tenderness  MSK: Normal gait; No digital clubbing or cyanosis; examination of the (head/neck/spine/ribs/pelvis, RUE, LUE, RLE, LLE) without misalignment,            Normal ROM without pain, no spinal tenderness, normal muscle strength/tone  SKIN: No rashes or ulcers noted; no subcutaneous nodules or induration palpable  NEURO: CN II-XII intact; normal reflexes in upper and lower extremities, sensation intact in upper and lower extremities b/l to light touch   PSYCH: Appropriate insight/judgment; A+O x 3, mood and affect appropriate, recent/remote memory intact

## 2024-08-24 NOTE — PATIENT PROFILE ADULT - FUNCTIONAL ASSESSMENT - BASIC MOBILITY 6.
4-calculated by average/Not able to assess (calculate score using [facility] averaging method)  4-calculated by average/Not able to assess (calculate score using [facility] averaging method)

## 2024-08-24 NOTE — H&P ADULT - NSHPLABSRESULTS_GEN_ALL_CORE
12.1   8.48  )-----------( 226      ( 24 Aug 2024 12:22 )             38.2       08-24    139  |  104  |  5<L>  ----------------------------<  88  4.2   |  22  |  0.8    Ca    9.3      24 Aug 2024 12:22    TPro  7.2  /  Alb  4.7  /  TBili  0.9  /  DBili  x   /  AST  16  /  ALT  16  /  AlkPhos  65  08-24              Urinalysis Basic - ( 24 Aug 2024 12:22 )    Color: x / Appearance: x / SG: x / pH: x  Gluc: 88 mg/dL / Ketone: x  / Bili: x / Urobili: x   Blood: x / Protein: x / Nitrite: x   Leuk Esterase: x / RBC: x / WBC x   Sq Epi: x / Non Sq Epi: x / Bacteria: x            Lactate Trend            CAPILLARY BLOOD GLUCOSE                  RADIOLOGY & ADDITIONAL TESTS:    Imaging Personally Reviewed:  [ ] YES     EKG personally reviewed [ ] YES, interpretation:     Telemetry reviewed:

## 2024-08-24 NOTE — H&P ADULT - HISTORY OF PRESENT ILLNESS
24 year old woman G3A3 with a past medical history of asthma presented for flu like symptoms over the past few days from urgent care. She tested Covid positive and was sent to ER. Patient reported having cough, headache and sore throat 3 days ago which prompted her to present to urgent care. She also had dysuria and frequent urination which she was tested for a UA and urine culture 2 days ago in the urgent care. The urinary tract infection symptoms are still persistent even though she took Microbid. She denies fever, chills, vomiting, or changes in bowel habits. Patient was tested for pregnancy in July and underwent an  with misoprostol. No retained product of conception were seen on repeat US. Patient does not smoke, drink alcohol or use illicit drugs. She is a . She has allergy for pollen. She returned from [place] a few days ago    Vitals in the ED: T: 37.2    BP: 144/89    HR: 78   Spo2: 98%  CXR: No infiltrates  Urine cultures: came back positive for E.coli ESBL        Admitted for treatment of E.coli ESBL

## 2024-08-24 NOTE — ED PROVIDER NOTE - CLINICAL SUMMARY MEDICAL DECISION MAKING FREE TEXT BOX
25 yo F p/w symptomatic UTI despite trial of outpatient management. urine culture with ESBL E. coli. labs reviewed. given iv abx, admitted for further management.

## 2024-08-24 NOTE — ED ADULT NURSE NOTE - OBJECTIVE STATEMENT
pt presents with UTI symptoms, endorsed getting PO Flagyl and nitrofurantoin. states she had an   and is covid+ after a trip to [place].

## 2024-08-24 NOTE — PATIENT PROFILE ADULT - FALL HARM RISK - UNIVERSAL INTERVENTIONS
Bed in lowest position, wheels locked, appropriate side rails in place/Call bell, personal items and telephone in reach/Instruct patient to call for assistance before getting out of bed or chair/Non-slip footwear when patient is out of bed/[place] to call system/Physically safe environment - no spills, clutter or unnecessary equipment/Purposeful Proactive Rounding/Room/bathroom lighting operational, light cord in reach

## 2024-08-24 NOTE — H&P ADULT - NSHPREVIEWOFSYSTEMS_GEN_ALL_CORE
REVIEW OF SYSTEMS:  CONSTITUTIONAL: No fever, chills, night sweats, or fatigue  EYES: No eye pain, visual disturbances, or discharge  ENMT:  No difficulty hearing, tinnitus, vertigo; No sinus or throat pain  NECK: No pain or stiffness  BREASTS: No pain, masses, or nipple discharge  RESPIRATORY: No cough, wheezing, or hemoptysis; No shortness of breath  CARDIOVASCULAR: No chest pain, palpitations, dizziness, or leg swelling  GASTROINTESTINAL: No abdominal or epigastric pain. No nausea, vomiting, or hematemesis; No diarrhea or constipation. No melena or hematochezia.  GENITOURINARY: Dysuria and increased urinary frequency  NEUROLOGICAL: No headaches, memory loss, loss of strength, numbness, or tremors  SKIN: No itching, burning, rashes, or lesions   LYMPH NODES: No enlarged glands  ENDOCRINE: No heat or cold intolerance; No hair loss  MUSCULOSKELETAL: No joint pain or swelling; No muscle, back, or extremity pain  PSYCHIATRIC: No depression, anxiety, mood swings, or difficulty sleeping  HEME/LYMPH: No easy bruising, or bleeding gums  ALLERY AND IMMUNOLOGIC: No hives or eczema

## 2024-08-25 LAB
ALBUMIN SERPL ELPH-MCNC: 4.6 G/DL — SIGNIFICANT CHANGE UP (ref 3.5–5.2)
ALP SERPL-CCNC: 59 U/L — SIGNIFICANT CHANGE UP (ref 30–115)
ALT FLD-CCNC: 20 U/L — SIGNIFICANT CHANGE UP (ref 0–41)
ANION GAP SERPL CALC-SCNC: 11 MMOL/L — SIGNIFICANT CHANGE UP (ref 7–14)
AST SERPL-CCNC: 22 U/L — SIGNIFICANT CHANGE UP (ref 0–41)
BILIRUB SERPL-MCNC: 1.2 MG/DL — SIGNIFICANT CHANGE UP (ref 0.2–1.2)
BUN SERPL-MCNC: 8 MG/DL — LOW (ref 10–20)
CALCIUM SERPL-MCNC: 9.1 MG/DL — SIGNIFICANT CHANGE UP (ref 8.4–10.5)
CHLORIDE SERPL-SCNC: 106 MMOL/L — SIGNIFICANT CHANGE UP (ref 98–110)
CO2 SERPL-SCNC: 22 MMOL/L — SIGNIFICANT CHANGE UP (ref 17–32)
CREAT SERPL-MCNC: 0.8 MG/DL — SIGNIFICANT CHANGE UP (ref 0.7–1.5)
EGFR: 105 ML/MIN/1.73M2 — SIGNIFICANT CHANGE UP
GLUCOSE SERPL-MCNC: 89 MG/DL — SIGNIFICANT CHANGE UP (ref 70–99)
HCG SERPL-ACNC: 1.1 MIU/ML — SIGNIFICANT CHANGE UP
HCT VFR BLD CALC: 36.7 % — LOW (ref 37–47)
HGB BLD-MCNC: 11.6 G/DL — LOW (ref 12–16)
MAGNESIUM SERPL-MCNC: 2.4 MG/DL — SIGNIFICANT CHANGE UP (ref 1.8–2.4)
MCHC RBC-ENTMCNC: 30.3 PG — SIGNIFICANT CHANGE UP (ref 27–31)
MCHC RBC-ENTMCNC: 31.6 G/DL — LOW (ref 32–37)
MCV RBC AUTO: 95.8 FL — SIGNIFICANT CHANGE UP (ref 81–99)
NRBC # BLD: 0 /100 WBCS — SIGNIFICANT CHANGE UP (ref 0–0)
PLATELET # BLD AUTO: 179 K/UL — SIGNIFICANT CHANGE UP (ref 130–400)
PMV BLD: 11.4 FL — HIGH (ref 7.4–10.4)
POTASSIUM SERPL-MCNC: 4.7 MMOL/L — SIGNIFICANT CHANGE UP (ref 3.5–5)
POTASSIUM SERPL-SCNC: 4.7 MMOL/L — SIGNIFICANT CHANGE UP (ref 3.5–5)
PROT SERPL-MCNC: 6.6 G/DL — SIGNIFICANT CHANGE UP (ref 6–8)
RBC # BLD: 3.83 M/UL — LOW (ref 4.2–5.4)
RBC # FLD: 13.1 % — SIGNIFICANT CHANGE UP (ref 11.5–14.5)
SODIUM SERPL-SCNC: 139 MMOL/L — SIGNIFICANT CHANGE UP (ref 135–146)
WBC # BLD: 8.39 K/UL — SIGNIFICANT CHANGE UP (ref 4.8–10.8)
WBC # FLD AUTO: 8.39 K/UL — SIGNIFICANT CHANGE UP (ref 4.8–10.8)

## 2024-08-25 PROCEDURE — 99232 SBSQ HOSP IP/OBS MODERATE 35: CPT

## 2024-08-25 RX ORDER — IBUPROFEN 600 MG
400 TABLET ORAL EVERY 6 HOURS
Refills: 0 | Status: DISCONTINUED | OUTPATIENT
Start: 2024-08-25 | End: 2024-08-28

## 2024-08-25 RX ORDER — ONDANSETRON 2 MG/ML
4 INJECTION, SOLUTION INTRAMUSCULAR; INTRAVENOUS EVERY 8 HOURS
Refills: 0 | Status: DISCONTINUED | OUTPATIENT
Start: 2024-08-25 | End: 2024-08-25

## 2024-08-25 RX ORDER — PHENAZOPYRIDINE HCL 100 MG
200 TABLET ORAL THREE TIMES A DAY
Refills: 0 | Status: DISCONTINUED | OUTPATIENT
Start: 2024-08-25 | End: 2024-08-28

## 2024-08-25 RX ORDER — ACETAMINOPHEN 325 MG/1
650 TABLET ORAL EVERY 6 HOURS
Refills: 0 | Status: DISCONTINUED | OUTPATIENT
Start: 2024-08-25 | End: 2024-08-28

## 2024-08-25 RX ORDER — ONDANSETRON 2 MG/ML
4 INJECTION, SOLUTION INTRAMUSCULAR; INTRAVENOUS EVERY 8 HOURS
Refills: 0 | Status: DISCONTINUED | OUTPATIENT
Start: 2024-08-25 | End: 2024-08-26

## 2024-08-25 RX ADMIN — Medication 200 MILLIGRAM(S): at 13:07

## 2024-08-25 RX ADMIN — ONDANSETRON 4 MILLIGRAM(S): 2 INJECTION, SOLUTION INTRAMUSCULAR; INTRAVENOUS at 07:49

## 2024-08-25 RX ADMIN — ACETAMINOPHEN 650 MILLIGRAM(S): 325 TABLET ORAL at 08:50

## 2024-08-25 RX ADMIN — ACETAMINOPHEN 650 MILLIGRAM(S): 325 TABLET ORAL at 07:53

## 2024-08-25 RX ADMIN — MEROPENEM 100 MILLIGRAM(S): 500 INJECTION, POWDER, FOR SOLUTION INTRAVENOUS at 06:10

## 2024-08-25 RX ADMIN — Medication 40 MILLIGRAM(S): at 06:10

## 2024-08-25 RX ADMIN — MEROPENEM 100 MILLIGRAM(S): 500 INJECTION, POWDER, FOR SOLUTION INTRAVENOUS at 13:08

## 2024-08-25 RX ADMIN — MEROPENEM 100 MILLIGRAM(S): 500 INJECTION, POWDER, FOR SOLUTION INTRAVENOUS at 21:57

## 2024-08-25 RX ADMIN — SUCRALFATE 1 GRAM(S): 1 SUSPENSION ORAL at 06:10

## 2024-08-25 RX ADMIN — SUCRALFATE 1 GRAM(S): 1 SUSPENSION ORAL at 17:17

## 2024-08-25 RX ADMIN — Medication 200 MILLIGRAM(S): at 21:57

## 2024-08-25 NOTE — CONSULT NOTE ADULT - SUBJECTIVE AND OBJECTIVE BOX
CHELSEA WARD  24y, Female  Allergy: No Known Allergies      CHIEF COMPLAINT:     LOS  1d    HPI:  24 year old woman G3A3 with a past medical history of asthma presented for flu like symptoms over the past few days from urgent care. She tested Covid positive and was sent to ER. Patient reported having cough, headache and sore throat 3 days ago which prompted her to present to urgent care. She also had dysuria and frequent urination which she was tested for a UA and urine culture 2 days ago in the urgent care. The urinary tract infection symptoms are still persistent even though she took Microbid. She denies fever, chills, vomiting, or changes in bowel habits. Patient was tested for pregnancy in July and underwent an  with misoprostol. No retained product of conception were seen on repeat US. Patient does not smoke, drink alcohol or use illicit drugs. She is a . She has allergy for pollen. She returned from [place] a few days ago    Vitals in the ED: T: 37.2    BP: 144/89    HR: 78   Spo2: 98%  CXR: No infiltrates  Urine cultures: came back positive for E.coli ESBL        Admitted for treatment of E.coli ESBL (24 Aug 2024 17:24)      INFECTIOUS DISEASE HISTORY:  History as above.  Initialy with flu like symptoms, found to COVID.   Started to developing worsening dysuria.   Went to urgent care but has not heard of results.   She was given a script for Metronidazole (not macrobid).     PAST MEDICAL & SURGICAL HISTORY:  Asthma          FAMILY HISTORY  Family history reviewed and non-contributory    SOCIAL HISTORY  Social History:  Denies etoh use, drug use       ROS  General: Denies rigors, nightsweats  HEENT: Denies headache, rhinorrhea, sore throat, eye pain  CV: Denies CP, palpitations  PULM: Denies wheezing, hemoptysis  GI: Denies hematemesis, hematochezia, melena  : Denies discharge, hematuria  MSK: Denies arthralgias, myalgias  SKIN: Denies rash, lesions  NEURO: Denies paresthesias, weakness  PSYCH: Denies depression, anxiety    VITALS:  T(F): 98.8, Max: 98.9 (24 @ 10:53)  HR: 61  BP: 105/64  RR: 17Vital Signs Last 24 Hrs  T(C): 37.1 (25 Aug 2024 04:30), Max: 37.2 (24 Aug 2024 10:53)  T(F): 98.8 (25 Aug 2024 04:30), Max: 98.9 (24 Aug 2024 10:53)  HR: 61 (25 Aug 2024 04:30) (61 - 78)  BP: 105/64 (25 Aug 2024 04:30) (105/64 - 144/89)  BP(mean): 78 (25 Aug 2024 04:30) (78 - 78)  RR: 17 (25 Aug 2024 04:30) (16 - 18)  SpO2: 100% (25 Aug 2024 04:30) (97% - 100%)    Parameters below as of 24 Aug 2024 19:42  Patient On (Oxygen Delivery Method): room air        PHYSICAL EXAM:  Gen: NAD, resting in bed  HEENT: Normocephalic, atraumatic  Neck: supple, no lymphadenopathy  CV: Regular rate & regular rhythm  Lungs: decreased BS at bases, no fremitus  Abdomen: Soft, BS present  Ext: Warm, well perfused  Neuro: non focal, awake  Skin: no rash, no erythema  Lines: no phlebitis    TESTS & MEASUREMENTS:                        12.1   8.48  )-----------( 226      ( 24 Aug 2024 12:22 )             38.2     08-24    139  |  104  |  5<L>  ----------------------------<  88  4.2   |  22  |  0.8    Ca    9.3      24 Aug 2024 12:22    TPro  7.2  /  Alb  4.7  /  TBili  0.9  /  DBili  x   /  AST  16  /  ALT  16  /  AlkPhos  65  08-24      LIVER FUNCTIONS - ( 24 Aug 2024 12:22 )  Alb: 4.7 g/dL / Pro: 7.2 g/dL / ALK PHOS: 65 U/L / ALT: 16 U/L / AST: 16 U/L / GGT: x           Urinalysis Basic - ( 24 Aug 2024 12:22 )    Color: x / Appearance: x / SG: x / pH: x  Gluc: 88 mg/dL / Ketone: x  / Bili: x / Urobili: x   Blood: x / Protein: x / Nitrite: x   Leuk Esterase: x / RBC: x / WBC x   Sq Epi: x / Non Sq Epi: x / Bacteria: x        Urinalysis with Rflx Culture (collected 24 @ 12:21)    Culture - Urine (collected 24 @ 13:33)  Source: Clean Catch None  Final Report (24 @ 12:52):    >100,000 CFU/ml Escherichia coli ESBL  Organism: Escherichia coli ESBL (24 @ 12:52)  Organism: Escherichia coli ESBL (24 @ 12:52)      Method Type: HEATHER      -  Ampicillin: R >16 These ampicillin results predict results for amoxicillin      -  Ampicillin/Sulbactam: R >16/8      -  Aztreonam: R >16      -  Cefazolin: R >16 For uncomplicated UTI with K. pneumoniae, E. coli, or P. mirablis: HEATHER <=16 is sensitive and HEATHER >=32 is resistant. This also predicts results for oral agents cefaclor, cefdinir, cefpodoxime, cefprozil, cefuroxime axetil, cephalexin and locarbef for uncomplicated UTI. Note that some isolates may be susceptible to these agents while testing resistant to cefazolin.      -  Cefepime: R >16      -  Ceftriaxone: R >32      -  Cefuroxime: R >16      -  Ciprofloxacin: R >2      -  Ertapenem: S <=0.5      -  Gentamicin: R >8      -  Imipenem: S <=1      -  Levofloxacin: R >4      -  Meropenem: S <=1      -  Nitrofurantoin: S <=32 Should not be used to treat pyelonephritis      -  Piperacillin/Tazobactam: S <=8      -  Tobramycin: R 8      -  Trimethoprim/Sulfamethoxazole: R >2/38    Urinalysis with Rflx Culture (collected 24 @ 13:33)            INFECTIOUS DISEASES TESTING      RADIOLOGY & ADDITIONAL TESTS:  I have personally reviewed the last Chest xray  CXR      CT      CARDIOLOGY TESTING      MEDICATIONS  meropenem  IVPB 1000 IV Intermittent every 8 hours  pantoprazole    Tablet 40 Oral before breakfast  sucralfate 1 Oral two times a day      Weight  Weight (kg): 68 (24 @ 10:53)    ANTIBIOTICS:  meropenem  IVPB 1000 milliGRAM(s) IV Intermittent every 8 hours      ALLERGIES:  No Known Allergies

## 2024-08-26 LAB
BASOPHILS # BLD AUTO: 0.03 K/UL — SIGNIFICANT CHANGE UP (ref 0–0.2)
BASOPHILS # BLD AUTO: 0.04 K/UL — SIGNIFICANT CHANGE UP (ref 0–0.2)
BASOPHILS NFR BLD AUTO: 0.6 % — SIGNIFICANT CHANGE UP (ref 0–1)
BASOPHILS NFR BLD AUTO: 0.8 % — SIGNIFICANT CHANGE UP (ref 0–1)
EOSINOPHIL # BLD AUTO: 0.15 K/UL — SIGNIFICANT CHANGE UP (ref 0–0.7)
EOSINOPHIL # BLD AUTO: 0.18 K/UL — SIGNIFICANT CHANGE UP (ref 0–0.7)
EOSINOPHIL NFR BLD AUTO: 3.2 % — SIGNIFICANT CHANGE UP (ref 0–8)
EOSINOPHIL NFR BLD AUTO: 3.8 % — SIGNIFICANT CHANGE UP (ref 0–8)
HCT VFR BLD CALC: 36.9 % — LOW (ref 37–47)
HCT VFR BLD CALC: 37.4 % — SIGNIFICANT CHANGE UP (ref 37–47)
HGB BLD-MCNC: 11.7 G/DL — LOW (ref 12–16)
HGB BLD-MCNC: 11.9 G/DL — LOW (ref 12–16)
IMM GRANULOCYTES NFR BLD AUTO: 0.2 % — SIGNIFICANT CHANGE UP (ref 0.1–0.3)
IMM GRANULOCYTES NFR BLD AUTO: 0.2 % — SIGNIFICANT CHANGE UP (ref 0.1–0.3)
LYMPHOCYTES # BLD AUTO: 1.15 K/UL — LOW (ref 1.2–3.4)
LYMPHOCYTES # BLD AUTO: 1.17 K/UL — LOW (ref 1.2–3.4)
LYMPHOCYTES # BLD AUTO: 24.8 % — SIGNIFICANT CHANGE UP (ref 20.5–51.1)
LYMPHOCYTES # BLD AUTO: 24.8 % — SIGNIFICANT CHANGE UP (ref 20.5–51.1)
MCHC RBC-ENTMCNC: 30.2 PG — SIGNIFICANT CHANGE UP (ref 27–31)
MCHC RBC-ENTMCNC: 30.5 PG — SIGNIFICANT CHANGE UP (ref 27–31)
MCHC RBC-ENTMCNC: 31.7 G/DL — LOW (ref 32–37)
MCHC RBC-ENTMCNC: 31.8 G/DL — LOW (ref 32–37)
MCV RBC AUTO: 94.9 FL — SIGNIFICANT CHANGE UP (ref 81–99)
MCV RBC AUTO: 96.3 FL — SIGNIFICANT CHANGE UP (ref 81–99)
MONOCYTES # BLD AUTO: 0.4 K/UL — SIGNIFICANT CHANGE UP (ref 0.1–0.6)
MONOCYTES # BLD AUTO: 0.42 K/UL — SIGNIFICANT CHANGE UP (ref 0.1–0.6)
MONOCYTES NFR BLD AUTO: 8.6 % — SIGNIFICANT CHANGE UP (ref 1.7–9.3)
MONOCYTES NFR BLD AUTO: 8.9 % — SIGNIFICANT CHANGE UP (ref 1.7–9.3)
NEUTROPHILS # BLD AUTO: 2.89 K/UL — SIGNIFICANT CHANGE UP (ref 1.4–6.5)
NEUTROPHILS # BLD AUTO: 2.9 K/UL — SIGNIFICANT CHANGE UP (ref 1.4–6.5)
NEUTROPHILS NFR BLD AUTO: 61.5 % — SIGNIFICANT CHANGE UP (ref 42.2–75.2)
NEUTROPHILS NFR BLD AUTO: 62.6 % — SIGNIFICANT CHANGE UP (ref 42.2–75.2)
NRBC # BLD: 0 /100 WBCS — SIGNIFICANT CHANGE UP (ref 0–0)
NRBC # BLD: 0 /100 WBCS — SIGNIFICANT CHANGE UP (ref 0–0)
PLATELET # BLD AUTO: 207 K/UL — SIGNIFICANT CHANGE UP (ref 130–400)
PLATELET # BLD AUTO: 212 K/UL — SIGNIFICANT CHANGE UP (ref 130–400)
PMV BLD: 11.2 FL — HIGH (ref 7.4–10.4)
PMV BLD: 11.4 FL — HIGH (ref 7.4–10.4)
RBC # BLD: 3.83 M/UL — LOW (ref 4.2–5.4)
RBC # BLD: 3.94 M/UL — LOW (ref 4.2–5.4)
RBC # FLD: 12.9 % — SIGNIFICANT CHANGE UP (ref 11.5–14.5)
RBC # FLD: 13.1 % — SIGNIFICANT CHANGE UP (ref 11.5–14.5)
SARS-COV-2 RNA SPEC QL NAA+PROBE: DETECTED
WBC # BLD: 4.63 K/UL — LOW (ref 4.8–10.8)
WBC # BLD: 4.72 K/UL — LOW (ref 4.8–10.8)
WBC # FLD AUTO: 4.63 K/UL — LOW (ref 4.8–10.8)
WBC # FLD AUTO: 4.72 K/UL — LOW (ref 4.8–10.8)

## 2024-08-26 PROCEDURE — 99232 SBSQ HOSP IP/OBS MODERATE 35: CPT

## 2024-08-26 RX ORDER — ONDANSETRON 2 MG/ML
4 INJECTION, SOLUTION INTRAMUSCULAR; INTRAVENOUS EVERY 6 HOURS
Refills: 0 | Status: DISCONTINUED | OUTPATIENT
Start: 2024-08-26 | End: 2024-08-28

## 2024-08-26 RX ADMIN — MEROPENEM 100 MILLIGRAM(S): 500 INJECTION, POWDER, FOR SOLUTION INTRAVENOUS at 06:28

## 2024-08-26 RX ADMIN — MEROPENEM 100 MILLIGRAM(S): 500 INJECTION, POWDER, FOR SOLUTION INTRAVENOUS at 13:55

## 2024-08-26 RX ADMIN — Medication 40 MILLIGRAM(S): at 06:27

## 2024-08-26 RX ADMIN — Medication 200 MILLIGRAM(S): at 16:24

## 2024-08-26 RX ADMIN — Medication 200 MILLIGRAM(S): at 21:32

## 2024-08-26 RX ADMIN — ONDANSETRON 4 MILLIGRAM(S): 2 INJECTION, SOLUTION INTRAMUSCULAR; INTRAVENOUS at 06:30

## 2024-08-26 RX ADMIN — SUCRALFATE 1 GRAM(S): 1 SUSPENSION ORAL at 06:27

## 2024-08-26 RX ADMIN — SUCRALFATE 1 GRAM(S): 1 SUSPENSION ORAL at 18:08

## 2024-08-26 RX ADMIN — Medication 200 MILLIGRAM(S): at 06:27

## 2024-08-26 RX ADMIN — MEROPENEM 100 MILLIGRAM(S): 500 INJECTION, POWDER, FOR SOLUTION INTRAVENOUS at 21:35

## 2024-08-26 NOTE — PROGRESS NOTE ADULT - ATTENDING COMMENTS
Patient seen and examined. Reports nausea. Denies heartburn, abd pain, diarrhea, constipation.   C/w meropenem. f/u urine culture. Pyridium for pain.   C/w isolation precautions for Covid.   Spoke to family at bedside.
Patient seen and examined. Reports pelvic fullness and pressure. No other complaints.   C/w meropenem. f/u urine culture. Pyridium for pain.   C/w isolation precautions.

## 2024-08-27 ENCOUNTER — TRANSCRIPTION ENCOUNTER (OUTPATIENT)
Age: 24
End: 2024-08-27

## 2024-08-27 LAB
-  AMPICILLIN/SULBACTAM: SIGNIFICANT CHANGE UP
-  AMPICILLIN: SIGNIFICANT CHANGE UP
-  AZTREONAM: SIGNIFICANT CHANGE UP
-  CEFAZOLIN: SIGNIFICANT CHANGE UP
-  CEFEPIME: SIGNIFICANT CHANGE UP
-  CEFTRIAXONE: SIGNIFICANT CHANGE UP
-  CEFUROXIME: SIGNIFICANT CHANGE UP
-  CIPROFLOXACIN: SIGNIFICANT CHANGE UP
-  ERTAPENEM: SIGNIFICANT CHANGE UP
-  GENTAMICIN: SIGNIFICANT CHANGE UP
-  IMIPENEM: SIGNIFICANT CHANGE UP
-  LEVOFLOXACIN: SIGNIFICANT CHANGE UP
-  MEROPENEM: SIGNIFICANT CHANGE UP
-  NITROFURANTOIN: SIGNIFICANT CHANGE UP
-  PIPERACILLIN/TAZOBACTAM: SIGNIFICANT CHANGE UP
-  TOBRAMYCIN: SIGNIFICANT CHANGE UP
-  TRIMETHOPRIM/SULFAMETHOXAZOLE: SIGNIFICANT CHANGE UP
ALBUMIN SERPL ELPH-MCNC: 4.2 G/DL — SIGNIFICANT CHANGE UP (ref 3.5–5.2)
ALP SERPL-CCNC: 56 U/L — SIGNIFICANT CHANGE UP (ref 30–115)
ALT FLD-CCNC: 20 U/L — SIGNIFICANT CHANGE UP (ref 0–41)
ANION GAP SERPL CALC-SCNC: 10 MMOL/L — SIGNIFICANT CHANGE UP (ref 7–14)
AST SERPL-CCNC: 17 U/L — SIGNIFICANT CHANGE UP (ref 0–41)
BASOPHILS # BLD AUTO: 0.06 K/UL — SIGNIFICANT CHANGE UP (ref 0–0.2)
BASOPHILS NFR BLD AUTO: 0.9 % — SIGNIFICANT CHANGE UP (ref 0–1)
BILIRUB SERPL-MCNC: 0.9 MG/DL — SIGNIFICANT CHANGE UP (ref 0.2–1.2)
BUN SERPL-MCNC: 8 MG/DL — LOW (ref 10–20)
CALCIUM SERPL-MCNC: 8.8 MG/DL — SIGNIFICANT CHANGE UP (ref 8.4–10.5)
CHLORIDE SERPL-SCNC: 107 MMOL/L — SIGNIFICANT CHANGE UP (ref 98–110)
CO2 SERPL-SCNC: 25 MMOL/L — SIGNIFICANT CHANGE UP (ref 17–32)
CREAT SERPL-MCNC: 0.8 MG/DL — SIGNIFICANT CHANGE UP (ref 0.7–1.5)
CULTURE RESULTS: ABNORMAL
EGFR: 105 ML/MIN/1.73M2 — SIGNIFICANT CHANGE UP
EOSINOPHIL # BLD AUTO: 0.22 K/UL — SIGNIFICANT CHANGE UP (ref 0–0.7)
EOSINOPHIL NFR BLD AUTO: 3.5 % — SIGNIFICANT CHANGE UP (ref 0–8)
GLUCOSE SERPL-MCNC: 88 MG/DL — SIGNIFICANT CHANGE UP (ref 70–99)
HCT VFR BLD CALC: 36 % — LOW (ref 37–47)
HGB BLD-MCNC: 11.5 G/DL — LOW (ref 12–16)
IMM GRANULOCYTES NFR BLD AUTO: 0.3 % — SIGNIFICANT CHANGE UP (ref 0.1–0.3)
LYMPHOCYTES # BLD AUTO: 1.7 K/UL — SIGNIFICANT CHANGE UP (ref 1.2–3.4)
LYMPHOCYTES # BLD AUTO: 26.7 % — SIGNIFICANT CHANGE UP (ref 20.5–51.1)
MAGNESIUM SERPL-MCNC: 2.2 MG/DL — SIGNIFICANT CHANGE UP (ref 1.8–2.4)
MCHC RBC-ENTMCNC: 30.4 PG — SIGNIFICANT CHANGE UP (ref 27–31)
MCHC RBC-ENTMCNC: 31.9 G/DL — LOW (ref 32–37)
MCV RBC AUTO: 95.2 FL — SIGNIFICANT CHANGE UP (ref 81–99)
METHOD TYPE: SIGNIFICANT CHANGE UP
MONOCYTES # BLD AUTO: 0.53 K/UL — SIGNIFICANT CHANGE UP (ref 0.1–0.6)
MONOCYTES NFR BLD AUTO: 8.3 % — SIGNIFICANT CHANGE UP (ref 1.7–9.3)
NEUTROPHILS # BLD AUTO: 3.84 K/UL — SIGNIFICANT CHANGE UP (ref 1.4–6.5)
NEUTROPHILS NFR BLD AUTO: 60.3 % — SIGNIFICANT CHANGE UP (ref 42.2–75.2)
NRBC # BLD: 0 /100 WBCS — SIGNIFICANT CHANGE UP (ref 0–0)
ORGANISM # SPEC MICROSCOPIC CNT: ABNORMAL
ORGANISM # SPEC MICROSCOPIC CNT: SIGNIFICANT CHANGE UP
PLATELET # BLD AUTO: 213 K/UL — SIGNIFICANT CHANGE UP (ref 130–400)
PMV BLD: 11.2 FL — HIGH (ref 7.4–10.4)
POTASSIUM SERPL-MCNC: 4.3 MMOL/L — SIGNIFICANT CHANGE UP (ref 3.5–5)
POTASSIUM SERPL-SCNC: 4.3 MMOL/L — SIGNIFICANT CHANGE UP (ref 3.5–5)
PROT SERPL-MCNC: 6.3 G/DL — SIGNIFICANT CHANGE UP (ref 6–8)
RBC # BLD: 3.78 M/UL — LOW (ref 4.2–5.4)
RBC # FLD: 12.9 % — SIGNIFICANT CHANGE UP (ref 11.5–14.5)
SODIUM SERPL-SCNC: 142 MMOL/L — SIGNIFICANT CHANGE UP (ref 135–146)
SPECIMEN SOURCE: SIGNIFICANT CHANGE UP
WBC # BLD: 6.37 K/UL — SIGNIFICANT CHANGE UP (ref 4.8–10.8)
WBC # FLD AUTO: 6.37 K/UL — SIGNIFICANT CHANGE UP (ref 4.8–10.8)

## 2024-08-27 PROCEDURE — 99232 SBSQ HOSP IP/OBS MODERATE 35: CPT

## 2024-08-27 RX ORDER — MECLIZINE HYDROCHLORIDE 25 MG/1
25 TABLET ORAL ONCE
Refills: 0 | Status: COMPLETED | OUTPATIENT
Start: 2024-08-27 | End: 2024-08-27

## 2024-08-27 RX ORDER — CHLORHEXIDINE GLUCONATE 40 MG/ML
1 SOLUTION TOPICAL
Refills: 0 | Status: DISCONTINUED | OUTPATIENT
Start: 2024-08-27 | End: 2024-08-28

## 2024-08-27 RX ORDER — POLYETHYLENE GLYCOL 3350 17 G/17G
17 POWDER, FOR SOLUTION ORAL
Refills: 0 | Status: DISCONTINUED | OUTPATIENT
Start: 2024-08-27 | End: 2024-08-28

## 2024-08-27 RX ADMIN — SUCRALFATE 1 GRAM(S): 1 SUSPENSION ORAL at 18:00

## 2024-08-27 RX ADMIN — ONDANSETRON 4 MILLIGRAM(S): 2 INJECTION, SOLUTION INTRAMUSCULAR; INTRAVENOUS at 05:47

## 2024-08-27 RX ADMIN — MEROPENEM 100 MILLIGRAM(S): 500 INJECTION, POWDER, FOR SOLUTION INTRAVENOUS at 13:12

## 2024-08-27 RX ADMIN — ONDANSETRON 4 MILLIGRAM(S): 2 INJECTION, SOLUTION INTRAMUSCULAR; INTRAVENOUS at 12:00

## 2024-08-27 RX ADMIN — Medication 200 MILLIGRAM(S): at 05:46

## 2024-08-27 RX ADMIN — POLYETHYLENE GLYCOL 3350 17 GRAM(S): 17 POWDER, FOR SOLUTION ORAL at 18:00

## 2024-08-27 RX ADMIN — Medication 200 MILLIGRAM(S): at 22:04

## 2024-08-27 RX ADMIN — MECLIZINE HYDROCHLORIDE 25 MILLIGRAM(S): 25 TABLET ORAL at 13:56

## 2024-08-27 RX ADMIN — SUCRALFATE 1 GRAM(S): 1 SUSPENSION ORAL at 05:46

## 2024-08-27 RX ADMIN — Medication 40 MILLIGRAM(S): at 05:46

## 2024-08-27 RX ADMIN — Medication 200 MILLIGRAM(S): at 13:12

## 2024-08-27 RX ADMIN — MEROPENEM 100 MILLIGRAM(S): 500 INJECTION, POWDER, FOR SOLUTION INTRAVENOUS at 05:47

## 2024-08-27 NOTE — DISCHARGE NOTE PROVIDER - HOSPITAL COURSE
24 year old woman G3A3 with a past medical history of asthma presented for flu like symptoms over the past few days from urgent care. She tested Covid positive and was sent to ER. Patient reported having cough, headache and sore throat 3 days ago which prompted her to present to urgent care. She also had dysuria and frequent urination which she was tested for a UA and urine culture 2 days ago in the urgent care. The urinary tract infection symptoms are still persistent even though she took Microbid. She denies fever, chills, vomiting, or changes in bowel habits. Patient was tested for pregnancy in July and underwent an  with misoprostol. No retained product of conception were seen on repeat US. Patient does not smoke, drink alcohol or use illicit drugs. She is a . She has allergy for pollen. She returned from [place] a few days ago    Vitals in the ED: T: 37.2    BP: 144/89    HR: 78   Spo2: 98%  CXR: No infiltrates  Urine cultures: came back positive for E.coli ESBL        Admitted for treatment of E.coli ESBL    #ESBL E.coli  #Simple UTI  - Patient test positive for ESBL E.coli.   - Still has dysuria and increased urinary frequency after taking microbid-> no more urinary symptoms   -IV meropenem  Urine culture  showed Staph Saprophyticus and E coli       #Mild covid infection  - Not in distress. no symptoms   -on RA  - Isolation    #Asthma  - On home rescue inhalers   24 year old woman G3A3 with a past medical history of asthma presented for flu like symptoms over the past few days from urgent care. She tested Covid positive and was sent to ER. Patient reported having cough, headache and sore throat 3 days ago which prompted her to present to urgent care. She also had dysuria and frequent urination which she was tested for a UA and urine culture 2 days ago in the urgent care. The urinary tract infection symptoms are still persistent even though she took Microbid. She denies fever, chills, vomiting, or changes in bowel habits. Patient was tested for pregnancy in July and underwent an  with misoprostol. No retained product of conception were seen on repeat US. Patient does not smoke, drink alcohol or use illicit drugs. She is a . She has allergy for pollen. She returned from [place] a few days ago    Vitals in the ED: T: 37.2    BP: 144/89    HR: 78   Spo2: 98%  CXR: No infiltrates  Urine cultures: came back positive for E.coli ESBL        Admitted for treatment of E.coli ESBL    #ESBL E.coli  #Simple UTI  - Patient test positive for ESBL E.coli.   - Still has dysuria and increased urinary frequency after taking microbid-> no more urinary symptoms   -IV meropenem for total 4 days then received fosfomycin one dose prior to dc   Urine culture  showed Staph Saprophyticus and E coli       #Mild covid infection  - Not in distress. no symptoms   -on RA  - Isolation    #Asthma  - On home rescue inhalers

## 2024-08-27 NOTE — DISCHARGE NOTE PROVIDER - CARE PROVIDER_API CALL
[facility] [address], Admin - [address]  [place] [address]  Phone: [phone]  Fax: [phone]  Follow Up Time:

## 2024-08-27 NOTE — DISCHARGE NOTE PROVIDER - NSDCFUSCHEDAPPT_GEN_ALL_CORE_FT
Radha Adams  [facility]  [facility]  Scheduled Appointment: 11/05/2024    [facility]  [facility]  Scheduled Appointment: 11/05/2024

## 2024-08-27 NOTE — DISCHARGE NOTE PROVIDER - NSDCMRMEDTOKEN_GEN_ALL_CORE_FT
Albuterol (Eqv-Proventil HFA) 90 mcg/inh inhalation aerosol: 2 puff(s) inhaled every 4 hours as needed for  shortness of breath and/or wheezing

## 2024-08-27 NOTE — DISCHARGE NOTE PROVIDER - NSDCCPCAREPLAN_GEN_ALL_CORE_FT
PRINCIPAL DISCHARGE DIAGNOSIS  Diagnosis: Acute UTI  Assessment and Plan of Treatment: As you prepare to leave, here are some important instructions to follow for your recovery from the urinary tract infection (UTI) and to help prevent future issues:  Please make sure to take the full course of antibiotics as prescribed. Even if you start feeling better before finishing the medication, it’s crucial to complete the entire prescription. Follow the directions for any pain relievers given to you, such as acetaminophen or ibuprofen, to manage any discomfort you may experience.  It’s important to drink plenty of water to help flush out the bacteria from your urinary tract. Avoid irritants like caffeine, alcohol, spicy foods, and nicotine until your symptoms have fully resolved. If you notice that your symptoms are worsening, if they persist, or if you experience severe pain, fever, chills, or blood in your urine, please seek medical attention promptly.

## 2024-08-28 ENCOUNTER — TRANSCRIPTION ENCOUNTER (OUTPATIENT)
Age: 24
End: 2024-08-28

## 2024-08-28 VITALS
TEMPERATURE: 98 F | OXYGEN SATURATION: 96 % | DIASTOLIC BLOOD PRESSURE: 73 MMHG | SYSTOLIC BLOOD PRESSURE: 110 MMHG | RESPIRATION RATE: 17 BRPM | HEART RATE: 82 BPM

## 2024-08-28 LAB
ALBUMIN SERPL ELPH-MCNC: 4.6 G/DL — SIGNIFICANT CHANGE UP (ref 3.5–5.2)
ALP SERPL-CCNC: 57 U/L — SIGNIFICANT CHANGE UP (ref 30–115)
ALT FLD-CCNC: 22 U/L — SIGNIFICANT CHANGE UP (ref 0–41)
ANION GAP SERPL CALC-SCNC: 14 MMOL/L — SIGNIFICANT CHANGE UP (ref 7–14)
AST SERPL-CCNC: 20 U/L — SIGNIFICANT CHANGE UP (ref 0–41)
BASOPHILS # BLD AUTO: 0.07 K/UL — SIGNIFICANT CHANGE UP (ref 0–0.2)
BASOPHILS NFR BLD AUTO: 1 % — SIGNIFICANT CHANGE UP (ref 0–1)
BILIRUB SERPL-MCNC: 1.4 MG/DL — HIGH (ref 0.2–1.2)
BUN SERPL-MCNC: 10 MG/DL — SIGNIFICANT CHANGE UP (ref 10–20)
CALCIUM SERPL-MCNC: 9.3 MG/DL — SIGNIFICANT CHANGE UP (ref 8.4–10.5)
CHLORIDE SERPL-SCNC: 102 MMOL/L — SIGNIFICANT CHANGE UP (ref 98–110)
CO2 SERPL-SCNC: 22 MMOL/L — SIGNIFICANT CHANGE UP (ref 17–32)
CREAT SERPL-MCNC: 0.8 MG/DL — SIGNIFICANT CHANGE UP (ref 0.7–1.5)
EGFR: 105 ML/MIN/1.73M2 — SIGNIFICANT CHANGE UP
EOSINOPHIL # BLD AUTO: 0.1 K/UL — SIGNIFICANT CHANGE UP (ref 0–0.7)
EOSINOPHIL NFR BLD AUTO: 1.5 % — SIGNIFICANT CHANGE UP (ref 0–8)
GLUCOSE SERPL-MCNC: 73 MG/DL — SIGNIFICANT CHANGE UP (ref 70–99)
HCT VFR BLD CALC: 37.3 % — SIGNIFICANT CHANGE UP (ref 37–47)
HGB BLD-MCNC: 12 G/DL — SIGNIFICANT CHANGE UP (ref 12–16)
IMM GRANULOCYTES NFR BLD AUTO: 0.4 % — HIGH (ref 0.1–0.3)
LYMPHOCYTES # BLD AUTO: 1.7 K/UL — SIGNIFICANT CHANGE UP (ref 1.2–3.4)
LYMPHOCYTES # BLD AUTO: 24.9 % — SIGNIFICANT CHANGE UP (ref 20.5–51.1)
MAGNESIUM SERPL-MCNC: 2.1 MG/DL — SIGNIFICANT CHANGE UP (ref 1.8–2.4)
MCHC RBC-ENTMCNC: 30.8 PG — SIGNIFICANT CHANGE UP (ref 27–31)
MCHC RBC-ENTMCNC: 32.2 G/DL — SIGNIFICANT CHANGE UP (ref 32–37)
MCV RBC AUTO: 95.6 FL — SIGNIFICANT CHANGE UP (ref 81–99)
MONOCYTES # BLD AUTO: 0.31 K/UL — SIGNIFICANT CHANGE UP (ref 0.1–0.6)
MONOCYTES NFR BLD AUTO: 4.5 % — SIGNIFICANT CHANGE UP (ref 1.7–9.3)
NEUTROPHILS # BLD AUTO: 4.62 K/UL — SIGNIFICANT CHANGE UP (ref 1.4–6.5)
NEUTROPHILS NFR BLD AUTO: 67.7 % — SIGNIFICANT CHANGE UP (ref 42.2–75.2)
NRBC # BLD: 0 /100 WBCS — SIGNIFICANT CHANGE UP (ref 0–0)
PLATELET # BLD AUTO: 246 K/UL — SIGNIFICANT CHANGE UP (ref 130–400)
PMV BLD: 11.1 FL — HIGH (ref 7.4–10.4)
POTASSIUM SERPL-MCNC: 3.9 MMOL/L — SIGNIFICANT CHANGE UP (ref 3.5–5)
POTASSIUM SERPL-SCNC: 3.9 MMOL/L — SIGNIFICANT CHANGE UP (ref 3.5–5)
PROT SERPL-MCNC: 6.7 G/DL — SIGNIFICANT CHANGE UP (ref 6–8)
RBC # BLD: 3.9 M/UL — LOW (ref 4.2–5.4)
RBC # FLD: 12.6 % — SIGNIFICANT CHANGE UP (ref 11.5–14.5)
SODIUM SERPL-SCNC: 138 MMOL/L — SIGNIFICANT CHANGE UP (ref 135–146)
WBC # BLD: 6.83 K/UL — SIGNIFICANT CHANGE UP (ref 4.8–10.8)
WBC # FLD AUTO: 6.83 K/UL — SIGNIFICANT CHANGE UP (ref 4.8–10.8)

## 2024-08-28 PROCEDURE — 93010 ELECTROCARDIOGRAM REPORT: CPT

## 2024-08-28 PROCEDURE — 99239 HOSP IP/OBS DSCHRG MGMT >30: CPT

## 2024-08-28 RX ORDER — FOSFOMYCIN TROMETHAMINE 3 G/1
3 POWDER ORAL ONCE
Refills: 0 | Status: COMPLETED | OUTPATIENT
Start: 2024-08-28 | End: 2024-08-28

## 2024-08-28 RX ORDER — MEROPENEM 500 MG/10ML
1000 INJECTION, POWDER, FOR SOLUTION INTRAVENOUS EVERY 8 HOURS
Refills: 0 | Status: DISCONTINUED | OUTPATIENT
Start: 2024-08-28 | End: 2024-08-28

## 2024-08-28 RX ADMIN — MEROPENEM 100 MILLIGRAM(S): 500 INJECTION, POWDER, FOR SOLUTION INTRAVENOUS at 08:19

## 2024-08-28 RX ADMIN — Medication 200 MILLIGRAM(S): at 13:26

## 2024-08-28 RX ADMIN — POLYETHYLENE GLYCOL 3350 17 GRAM(S): 17 POWDER, FOR SOLUTION ORAL at 05:38

## 2024-08-28 RX ADMIN — FOSFOMYCIN TROMETHAMINE 3 GRAM(S): 3 POWDER ORAL at 14:34

## 2024-08-28 RX ADMIN — SUCRALFATE 1 GRAM(S): 1 SUSPENSION ORAL at 05:37

## 2024-08-28 RX ADMIN — MEROPENEM 100 MILLIGRAM(S): 500 INJECTION, POWDER, FOR SOLUTION INTRAVENOUS at 13:26

## 2024-08-28 RX ADMIN — CHLORHEXIDINE GLUCONATE 1 APPLICATION(S): 40 SOLUTION TOPICAL at 05:38

## 2024-08-28 RX ADMIN — Medication 200 MILLIGRAM(S): at 05:37

## 2024-08-28 RX ADMIN — Medication 40 MILLIGRAM(S): at 05:37

## 2024-08-28 NOTE — PROGRESS NOTE ADULT - SUBJECTIVE AND OBJECTIVE BOX
Pt seen and examined at bedside. No SOB, CP.         VITAL SIGNS (Last 24 hrs):  T(C): 36.7 (08-27-24 @ 13:44), Max: 37.1 (08-26-24 @ 20:47)  HR: 78 (08-27-24 @ 13:44) (64 - 78)  BP: 107/73 (08-27-24 @ 13:44) (102/64 - 122/69)  RR: 17 (08-27-24 @ 13:44) (17 - 18)  SpO2: 94% (08-27-24 @ 13:44) (94% - 99%)        I&O's Summary      PHYSICAL EXAM:  GENERAL: NAD   HEAD:  Atraumatic, Normocephalic  EYES:  conjunctiva and sclera clear  NECK: Supple, No JVD  CHEST/LUNG: Clear to auscultation bilaterally; No wheeze  HEART: Regular rate and rhythm; No murmurs, rubs, or gallops  ABDOMEN: Soft, Nontender, Nondistended; Bowel sounds present  EXTREMITIES:  2+ Peripheral Pulses, No clubbing, cyanosis, or edema  PSYCH: AAOx3  NEUROLOGY: non-focal  SKIN: No rashes or lesions    Labs Reviewed       CBC Full  -  ( 27 Aug 2024 06:49 )  WBC Count : 6.37 K/uL  Hemoglobin : 11.5 g/dL  Hematocrit : 36.0 %  Platelet Count - Automated : 213 K/uL  Mean Cell Volume : 95.2 fL  Mean Cell Hemoglobin : 30.4 pg  Mean Cell Hemoglobin Concentration : 31.9 g/dL  Auto Neutrophil # : 3.84 K/uL  Auto Lymphocyte # : 1.70 K/uL  Auto Monocyte # : 0.53 K/uL  Auto Eosinophil # : 0.22 K/uL  Auto Basophil # : 0.06 K/uL  Auto Neutrophil % : 60.3 %  Auto Lymphocyte % : 26.7 %  Auto Monocyte % : 8.3 %  Auto Eosinophil % : 3.5 %  Auto Basophil % : 0.9 %    BMP:    08-27 @ 06:49    Blood Urea Nitrogen - 8  Calcium - 8.8  Carbon  Dioxide - 25  Chloride - 107  Creatinine - 0.8  Glucose - 88  Potassium - 4.3  Sodium - 142      Hemoglobin A1c -     Urine Culture:  08-24 @ 12:21 Urine culture: --    Culture Results:   50,000 - 99,000 CFU/mL Escherichia coli Susceptibility to follow.  >100,000 CFU/ml Staphylococcus saprophyticus  ***********Note************  Routine susceptibility testing of these urine isolates is not advised.  Due to the high antibiotic concentration achieved in urine,  uncomplicated urinary tract infections can be treated with commonly used  antibiotics (e.g. Nitrofurantoin, Trimethoprim/Sulfamethoxazole or  Fluoroquinolones).  Method Type: --  Organism: --  Organism Identification: --  Specimen Source: Clean Catch None            MEDICATIONS  (STANDING):  chlorhexidine 2% Cloths 1 Application(s) Topical <User Schedule>  pantoprazole    Tablet 40 milliGRAM(s) Oral before breakfast  phenazopyridine 200 milliGRAM(s) Oral three times a day  polyethylene glycol 3350 17 Gram(s) Oral two times a day  sucralfate 1 Gram(s) Oral two times a day    MEDICATIONS  (PRN):  acetaminophen     Tablet .. 650 milliGRAM(s) Oral every 6 hours PRN Temp greater or equal to 38C (100.4F), Mild Pain (1 - 3)  albuterol    90 MICROgram(s) HFA Inhaler 2 Puff(s) Inhalation every 4 hours PRN for shortness of breath and/or wheezing  ibuprofen  Tablet. 400 milliGRAM(s) Oral every 6 hours PRN Mild Pain (1 - 3), Moderate Pain (4 - 6)  ondansetron Injectable 4 milliGRAM(s) IV Push every 6 hours PRN Nausea and/or Vomiting  
SUBJECTIVE/OVERNIGHT EVENTS  Today is hospital day 1d. This morning patient was seen and examined at bedside, resting comfortably in bed. No acute or major events overnight.    MEDICATIONS  STANDING MEDICATIONS  meropenem  IVPB 1000 milliGRAM(s) IV Intermittent every 8 hours  pantoprazole    Tablet 40 milliGRAM(s) Oral before breakfast  phenazopyridine 200 milliGRAM(s) Oral three times a day  sucralfate 1 Gram(s) Oral two times a day    PRN MEDICATIONS  acetaminophen     Tablet .. 650 milliGRAM(s) Oral every 6 hours PRN  albuterol    90 MICROgram(s) HFA Inhaler 2 Puff(s) Inhalation every 4 hours PRN  ibuprofen  Tablet. 400 milliGRAM(s) Oral every 6 hours PRN  ondansetron Injectable 4 milliGRAM(s) IV Push every 8 hours PRN    VITALS  T(F): 98.8 (08-25-24 @ 04:30), Max: 98.8 (08-24-24 @ 19:42)  HR: 61 (08-25-24 @ 04:30) (61 - 73)  BP: 105/64 (08-25-24 @ 04:30) (105/64 - 119/72)  RR: 17 (08-25-24 @ 04:30) (17 - 18)  SpO2: 100% (08-25-24 @ 08:00) (97% - 100%)    PHYSICAL EXAM  GENERAL  (  x) NAD, lying in bed comfortably     (  ) obtunded     (  ) lethargic     (  ) somnolent    HEAD  (  ) Atraumatic     (  ) hematoma     (  ) laceration (specify location:       )     NECK  (  ) Supple     (  ) neck stiffness     (  ) nuchal rigidity     (  )  no JVD     (  ) JVD present ( -- cm)    HEART  Rate -->  ( x ) normal rate    (  ) bradycardic    (  ) tachycardic  Rhythm -->  ( x ) regular    (  ) regularly irregular    (  ) irregularly irregular  Murmurs -->  (  ) normal s1/s2    (  ) systolic murmur    (  ) diastolic murmur    (  ) continuous murmur     (  ) S3 present    (  ) S4 present    LUNGS  ( x )Unlabored respirations     (  ) tachypnea  ( x ) B/L air entry     (  ) decreased breath sounds in:  (location     )    (  ) no adventitious sound     (  ) crackles     (  ) wheezing      (  ) rhonchi      (specify location:       )  (  ) chest wall tenderness (specify location:       )    ABDOMEN  (x  ) Soft     (  ) tense   |   (  ) nondistended     (  ) distended   |   (  ) +BS     (  ) hypoactive bowel sounds     (  ) hyperactive bowel sounds  (x  ) nontender     (  ) RUQ tenderness     (  ) RLQ tenderness     (  ) LLQ tenderness     (  ) epigastric tenderness     (  ) diffuse tenderness  (  ) Splenomegaly      (  ) Hepatomegaly      (  ) Jaundice     (  ) ecchymosis     EXTREMITIES  ( x ) Normal     (  ) Rash     (  ) ecchymosis     (  ) varicose veins      (  ) pitting edema     (  ) non-pitting edema   (  ) ulceration     (  ) gangrene:     (location:     )    NERVOUS SYSTEM  ( x ) A&Ox3     (  ) confused     (  ) lethargic  CN II-XII:     (  ) Intact     (  ) focal deficits  (Specify:     )   Upper extremities:     (  ) strength X/5     (  ) focal deficit (specify:    )  Lower extremities:     (  ) strength  X/5    (  ) focal deficit (specify:    )    SKIN  (  ) No rashes or lesions     (  ) maculopapular rash     (  ) pustules     (  ) vesicles     (  ) ulcer     (  ) ecchymosis     (specify location:     )    (  ) Indwelling Mercer Catheter   Date insterted:    Reason (  ) Critical illness     (  ) urinary retention    (  ) Accurate Ins/Outs Monitoring     (  ) CMO patient    (  ) Central Line  Date inserted:  Location: (  ) Right IJ   (  ) Left IJ   (  ) Right Fem   (  ) Left Fem    (  ) SPC  (  ) pigtail  (  ) PEG tube  (  ) colostomy  (  ) jejunostomy  (  ) U-Dall    LABS             11.6   8.39  )-----------( 179      ( 08-25-24 @ 09:18 )             36.7     139  |  106  |  8   -------------------------<  89   08-25-24 @ 09:18  4.7  |  22  |  0.8    Ca      9.1     08-25-24 @ 09:18  Mg     2.4     08-25-24 @ 09:18    TPro  6.6  /  Alb  4.6  /  TBili  1.2  /  DBili  x   /  AST  22  /  ALT  20  /  AlkPhos  59  /  GGT  x     08-25-24 @ 09:18        Urinalysis Basic - ( 25 Aug 2024 09:18 )    Color: x / Appearance: x / SG: x / pH: x  Gluc: 89 mg/dL / Ketone: x  / Bili: x / Urobili: x   Blood: x / Protein: x / Nitrite: x   Leuk Esterase: x / RBC: x / WBC x   Sq Epi: x / Non Sq Epi: x / Bacteria: x          Urinalysis with Rflx Culture (collected 24 Aug 2024 12:21)      IMAGING
CHELSEA WARD  24y, Female  Allergy: No Known Allergies      LOS  3d    CHIEF COMPLAINT: UTI (25 Aug 2024 14:01)      INTERVAL EVENTS/HPI  - No acute events overnight  - T(F): , Max: 98.7 (08-26-24 @ 20:47)  - had dysuria yesterday -- but none so far today   - WBC Count: 6.37 (08-27-24 @ 06:49)  WBC Count: 4.72 (08-26-24 @ 09:13)     - Creatinine: 0.8 (08-27-24 @ 06:49)       ROS  General: Denies rigors, nightsweats  HEENT: Denies headache, rhinorrhea, sore throat, eye pain  CV: Denies CP, palpitations  PULM: Denies wheezing, hemoptysis  GI: Denies hematemesis, hematochezia, melena  : Denies discharge, hematuria  MSK: Denies arthralgias, myalgias  SKIN: Denies rash, lesions  NEURO: Denies paresthesias, weakness  PSYCH: Denies depression, anxiety    VITALS:  T(F): 98.1, Max: 98.7 (08-26-24 @ 20:47)  HR: 78  BP: 107/73  RR: 17Vital Signs Last 24 Hrs  T(C): 36.7 (27 Aug 2024 13:44), Max: 37.1 (26 Aug 2024 20:47)  T(F): 98.1 (27 Aug 2024 13:44), Max: 98.7 (26 Aug 2024 20:47)  HR: 78 (27 Aug 2024 13:44) (64 - 78)  BP: 107/73 (27 Aug 2024 13:44) (102/64 - 122/69)  BP(mean): --  RR: 17 (27 Aug 2024 13:44) (17 - 18)  SpO2: 94% (27 Aug 2024 13:44) (94% - 99%)    Parameters below as of 27 Aug 2024 13:44  Patient On (Oxygen Delivery Method): room air        PHYSICAL EXAM:  Gen: NAD, resting in bed  HEENT: Normocephalic, atraumatic  Neck: supple, no lymphadenopathy  CV: Regular rate & regular rhythm  Lungs: decreased BS at bases, no fremitus  Abdomen: Soft, BS present  Ext: Warm, well perfused  Neuro: non focal, awake  Skin: no rash, no erythema  Lines: no phlebitis    FH: Non-contributory  Social Hx: Non-contributory    TESTS & MEASUREMENTS:                        11.5   6.37  )-----------( 213      ( 27 Aug 2024 06:49 )             36.0     08-27    142  |  107  |  8<L>  ----------------------------<  88  4.3   |  25  |  0.8    Ca    8.8      27 Aug 2024 06:49  Mg     2.2     08-27    TPro  6.3  /  Alb  4.2  /  TBili  0.9  /  DBili  x   /  AST  17  /  ALT  20  /  AlkPhos  56  08-27      LIVER FUNCTIONS - ( 27 Aug 2024 06:49 )  Alb: 4.2 g/dL / Pro: 6.3 g/dL / ALK PHOS: 56 U/L / ALT: 20 U/L / AST: 17 U/L / GGT: x           Urinalysis Basic - ( 27 Aug 2024 06:49 )    Color: x / Appearance: x / SG: x / pH: x  Gluc: 88 mg/dL / Ketone: x  / Bili: x / Urobili: x   Blood: x / Protein: x / Nitrite: x   Leuk Esterase: x / RBC: x / WBC x   Sq Epi: x / Non Sq Epi: x / Bacteria: x        Urinalysis with Rflx Culture (collected 08-24-24 @ 12:21)    Culture - Urine (collected 08-24-24 @ 12:21)  Source: Clean Catch None  Preliminary Report (08-26-24 @ 17:33):    50,000 - 99,000 CFU/mL Escherichia coli Susceptibility to follow.    >100,000 CFU/ml Staphylococcus saprophyticus    ***********Note************    Routine susceptibility testing of these urine isolates is not advised.    Due to the high antibiotic concentration achieved in urine,    uncomplicated urinary tract infections can be treated with commonly used    antibiotics (e.g. Nitrofurantoin, Trimethoprim/Sulfamethoxazole or    Fluoroquinolones).            INFECTIOUS DISEASES TESTING  COVID-19 PCR: Detected (08-25-24 @ 17:20)      INFLAMMATORY MARKERS      RADIOLOGY & ADDITIONAL TESTS:  I have personally reviewed the last available Chest xray  CXR      CT      CARDIOLOGY TESTING      MEDICATIONS  chlorhexidine 2% Cloths 1 Topical <User Schedule>  pantoprazole    Tablet 40 Oral before breakfast  phenazopyridine 200 Oral three times a day  polyethylene glycol 3350 17 Oral two times a day  sucralfate 1 Oral two times a day      WEIGHT  Weight (kg): 68 (08-24-24 @ 10:53)  Creatinine: 0.8 mg/dL (08-27-24 @ 06:49)      ANTIBIOTICS:      All available historical records have been reviewed      
SUBJECTIVE/OVERNIGHT EVENTS  Today is hospital day 2d. This morning patient was seen and examined at bedside, resting comfortably in bed. No acute or major events overnight. Patient reported resolution of symptoms today, only nausea at the moment. she mentioned that she had dysuria yesterday that resolved     HOSPITAL COURSE  Day 1:   Day 2:   Day 3:     CODE STATUS:    FAMILY COMMUNICATION  Contact date:  Name of person contacted:  Relationship to patient:  Communication details:    MEDICATIONS  STANDING MEDICATIONS  meropenem  IVPB 1000 milliGRAM(s) IV Intermittent every 8 hours  pantoprazole    Tablet 40 milliGRAM(s) Oral before breakfast  phenazopyridine 200 milliGRAM(s) Oral three times a day  sucralfate 1 Gram(s) Oral two times a day    PRN MEDICATIONS  acetaminophen     Tablet .. 650 milliGRAM(s) Oral every 6 hours PRN  albuterol    90 MICROgram(s) HFA Inhaler 2 Puff(s) Inhalation every 4 hours PRN  ibuprofen  Tablet. 400 milliGRAM(s) Oral every 6 hours PRN  ondansetron Injectable 4 milliGRAM(s) IV Push every 6 hours PRN    VITALS  T(F): 98 (08-26-24 @ 04:41), Max: 98.9 (08-25-24 @ 19:55)  HR: 66 (08-26-24 @ 04:41) (66 - 84)  BP: 112/69 (08-26-24 @ 04:41) (98/62 - 112/69)  RR: 18 (08-26-24 @ 04:41) (18 - 18)  SpO2: 100% (08-26-24 @ 04:41) (100% - 100%)    PHYSICAL EXAM  GENERAL  (  ) NAD, lying in bed comfortably     (  ) obtunded     (  ) lethargic     (  ) somnolent    HEAD  (  ) Atraumatic     (  ) hematoma     (  ) laceration (specify location:       )     NECK  (x  ) Supple     (  ) neck stiffness     (  ) nuchal rigidity     (  )  no JVD     (  ) JVD present ( -- cm)    HEART  Rate -->  (x  ) normal rate    (  ) bradycardic    (  ) tachycardic  Rhythm -->  ( x ) regular    (  ) regularly irregular    (  ) irregularly irregular  Murmurs -->  ( x ) normal s1/s2    (  ) systolic murmur    (  ) diastolic murmur    (  ) continuous murmur     (  ) S3 present    (  ) S4 present    LUNGS  ( x )Unlabored respirations     (  ) tachypnea  (x  ) B/L air entry     (  ) decreased breath sounds in:  (location     )    ( x ) no adventitious sound     (  ) crackles     (  ) wheezing      (  ) rhonchi      (specify location:       )  (  ) chest wall tenderness (specify location:       )    ABDOMEN  ( x ) Soft     (  ) tense   |   (  ) nondistended     (  ) distended   |   (  ) +BS     (  ) hypoactive bowel sounds     (  ) hyperactive bowel sounds  (x  ) nontender     (  ) RUQ tenderness     (  ) RLQ tenderness     (  ) LLQ tenderness     (  ) epigastric tenderness     (  ) diffuse tenderness  (  ) Splenomegaly      (  ) Hepatomegaly      (  ) Jaundice     (  ) ecchymosis     EXTREMITIES  (x  ) Normal     (  ) Rash     (  ) ecchymosis     (  ) varicose veins      (  ) pitting edema     (  ) non-pitting edema   (  ) ulceration     (  ) gangrene:     (location:     )    NERVOUS SYSTEM  ( x ) A&Ox3     (  ) confused     (  ) lethargic  CN II-XII:     (  ) Intact     (  ) focal deficits  (Specify:     )   Upper extremities:     (  ) strength X/5     (  ) focal deficit (specify:    )  Lower extremities:     (  ) strength  X/5    (  ) focal deficit (specify:    )    SKIN  (  ) No rashes or lesions     (  ) maculopapular rash     (  ) pustules     (  ) vesicles     (  ) ulcer     (  ) ecchymosis     (specify location:     )    (  ) Indwelling Mercer Catheter   Date insterted:    Reason (  ) Critical illness     (  ) urinary retention    (  ) Accurate Ins/Outs Monitoring     (  ) CMO patient    (  ) Central Line  Date inserted:  Location: (  ) Right IJ   (  ) Left IJ   (  ) Right Fem   (  ) Left Fem    (  ) SPC  (  ) pigtail  (  ) PEG tube  (  ) colostomy  (  ) jejunostomy  (  ) U-Dall    LABS             11.7   4.72  )-----------( 207      ( 08-26-24 @ 09:13 )             36.9     139  |  106  |  8   -------------------------<  89   08-25-24 @ 09:18  4.7  |  22  |  0.8    Ca      9.1     08-25-24 @ 09:18  Mg     2.4     08-25-24 @ 09:18    TPro  6.6  /  Alb  4.6  /  TBili  1.2  /  DBili  x   /  AST  22  /  ALT  20  /  AlkPhos  59  /  GGT  x     08-25-24 @ 09:18        Urinalysis Basic - ( 25 Aug 2024 09:18 )    Color: x / Appearance: x / SG: x / pH: x  Gluc: 89 mg/dL / Ketone: x  / Bili: x / Urobili: x   Blood: x / Protein: x / Nitrite: x   Leuk Esterase: x / RBC: x / WBC x   Sq Epi: x / Non Sq Epi: x / Bacteria: x          Urinalysis with Rflx Culture (collected 24 Aug 2024 12:21)    Culture - Urine (collected 24 Aug 2024 12:21)  Source: Clean Catch None  Preliminary Report (25 Aug 2024 15:45):    50,000 - 99,000 CFU/mL Escherichia coli      IMAGING
CHELSEA WARD  24y, Female  Allergy: No Known Allergies      LOS  4d    CHIEF COMPLAINT: UTI (25 Aug 2024 14:01)      INTERVAL EVENTS/HPI  - No acute events overnight  - T(F): , Max: 98.1 (08-27-24 @ 13:44)  - poor appetite -- denies vomiting -- no dysuria   - WBC Count: 6.83 (08-28-24 @ 04:30)  WBC Count: 6.37 (08-27-24 @ 06:49)     - Creatinine: 0.8 (08-28-24 @ 04:30)  Creatinine: 0.8 (08-27-24 @ 06:49)       ROS  General: Denies rigors, nightsweats  HEENT: Denies headache, rhinorrhea, sore throat, eye pain  CV: Denies CP, palpitations  PULM: Denies wheezing, hemoptysis  GI: Denies hematemesis, hematochezia, melena  : Denies discharge, hematuria  MSK: Denies arthralgias, myalgias  SKIN: Denies rash, lesions  NEURO: Denies paresthesias, weakness  PSYCH: Denies depression, anxiety    VITALS:  T(F): 98, Max: 98.1 (08-27-24 @ 13:44)  HR: 81  BP: 105/66  RR: 17Vital Signs Last 24 Hrs  T(C): 36.7 (28 Aug 2024 04:19), Max: 36.7 (27 Aug 2024 13:44)  T(F): 98 (28 Aug 2024 04:19), Max: 98.1 (27 Aug 2024 13:44)  HR: 81 (28 Aug 2024 04:19) (76 - 81)  BP: 105/66 (28 Aug 2024 04:19) (93/56 - 107/73)  BP(mean): 79 (28 Aug 2024 04:19) (79 - 79)  RR: 17 (28 Aug 2024 04:19) (17 - 18)  SpO2: 95% (28 Aug 2024 04:19) (94% - 98%)    Parameters below as of 27 Aug 2024 20:58  Patient On (Oxygen Delivery Method): room air        PHYSICAL EXAM:  Gen: NAD, resting in bed  HEENT: Normocephalic, atraumatic  Neck: supple, no lymphadenopathy  CV: Regular rate & regular rhythm  Lungs: decreased BS at bases, no fremitus  Abdomen: Soft, BS present  Ext: Warm, well perfused  Neuro: non focal, awake  Skin: no rash, no erythema  Lines: no phlebitis    FH: Non-contributory  Social Hx: Non-contributory    TESTS & MEASUREMENTS:                        12.0   6.83  )-----------( 246      ( 28 Aug 2024 04:30 )             37.3     08-28    138  |  102  |  10  ----------------------------<  73  3.9   |  22  |  0.8    Ca    9.3      28 Aug 2024 04:30  Mg     2.1     08-28    TPro  6.7  /  Alb  4.6  /  TBili  1.4<H>  /  DBili  x   /  AST  20  /  ALT  22  /  AlkPhos  57  08-28      LIVER FUNCTIONS - ( 28 Aug 2024 04:30 )  Alb: 4.6 g/dL / Pro: 6.7 g/dL / ALK PHOS: 57 U/L / ALT: 22 U/L / AST: 20 U/L / GGT: x           Urinalysis Basic - ( 28 Aug 2024 04:30 )    Color: x / Appearance: x / SG: x / pH: x  Gluc: 73 mg/dL / Ketone: x  / Bili: x / Urobili: x   Blood: x / Protein: x / Nitrite: x   Leuk Esterase: x / RBC: x / WBC x   Sq Epi: x / Non Sq Epi: x / Bacteria: x        Urinalysis with Rflx Culture (collected 08-24-24 @ 12:21)    Culture - Urine (collected 08-24-24 @ 12:21)  Source: Clean Catch None  Final Report (08-27-24 @ 17:26):    50,000 - 99,000 CFU/mL Escherichia coli ESBL    >100,000 CFU/ml Staphylococcus saprophyticus    ***********Note************    Routine susceptibility testing of these urine isolates is not advised.    Due to the high antibiotic concentration achieved in urine,    uncomplicated urinary tract infections can be treated with commonly used    antibiotics (e.g. Nitrofurantoin, Trimethoprim/Sulfamethoxazole or    Fluoroquinolones).  Organism: Escherichia coli ESBL (08-27-24 @ 17:26)  Organism: Escherichia coli ESBL (08-27-24 @ 17:26)      -  Levofloxacin: R >4      -  Tobramycin: R 8      -  Nitrofurantoin: S <=32 Should not be used to treat pyelonephritis      -  Aztreonam: R >16      -  Gentamicin: R >8      -  Cefazolin: R >16 For uncomplicated UTI with K. pneumoniae, E. coli, or P. mirablis: HEATHER <=16 is sensitive and HEATHER >=32 is resistant. This also predicts results for oral agents cefaclor, cefdinir, cefpodoxime, cefprozil, cefuroxime axetil, cephalexin and locarbef for uncomplicated UTI. Note that some isolates may be susceptible to these agents while testing resistant to cefazolin.      -  Cefepime: R >16      -  Piperacillin/Tazobactam: S <=8      -  Ciprofloxacin: R >2      -  Imipenem: S <=1      -  Ceftriaxone: R >32      -  Ampicillin: R >16 These ampicillin results predict results for amoxicillin      Method Type: HEATHER      -  Meropenem: S <=1      -  Ampicillin/Sulbactam: I 16/8      -  Cefuroxime: R >16      -  Trimethoprim/Sulfamethoxazole: R >2/38      -  Ertapenem: S <=0.5            INFECTIOUS DISEASES TESTING  COVID-19 PCR: Detected (08-25-24 @ 17:20)      INFLAMMATORY MARKERS      RADIOLOGY & ADDITIONAL TESTS:  I have personally reviewed the last available Chest xray  CXR      CT      CARDIOLOGY TESTING      MEDICATIONS  chlorhexidine 2% Cloths 1 Topical <User Schedule>  fosfomycin 3 Oral once  meropenem  IVPB 1000 IV Intermittent every 8 hours  pantoprazole    Tablet 40 Oral before breakfast  phenazopyridine 200 Oral three times a day  polyethylene glycol 3350 17 Oral two times a day  sucralfate 1 Oral two times a day      WEIGHT  Weight (kg): 68 (08-24-24 @ 10:53)  Creatinine: 0.8 mg/dL (08-28-24 @ 04:30)      ANTIBIOTICS:  fosfomycin 3 Gram(s) Oral once  meropenem  IVPB 1000 milliGRAM(s) IV Intermittent every 8 hours      All available historical records have been reviewed

## 2024-08-28 NOTE — DISCHARGE NOTE NURSING/CASE MANAGEMENT/SOCIAL WORK - PATIENT PORTAL LINK FT
You can access the FollowMyHealth Patient Portal offered by [facility] by registering at the following website: [email or web address]. By joining [facility]’s FollowMyHealth portal, you will also be able to view your health information using other applications (apps) compatible with our system.

## 2024-08-28 NOTE — DISCHARGE NOTE NURSING/CASE MANAGEMENT/SOCIAL WORK - NSDCPEFALRISK_GEN_ALL_CORE
For information on Fall & Injury Prevention, visit: [email or web address] OR  [email or web address] OR  https://www.cdc.gov/steadi/patient.html

## 2024-08-28 NOTE — PROGRESS NOTE ADULT - NS ATTEST RISK PROBLEM GEN_ALL_CORE FT
Antibiotic management    I have personally seen and examined this patient.    I have reviewed all pertinent clinical information and reviewed all relevant imaging and diagnostic studies personally.   I counseled the patient about diagnostic testing and treatment plan. All questions were answered.   I discussed recommendations with the primary team.
Reviewed urine Cx   Antibiotic management     I have personally seen and examined this patient.    I have reviewed all pertinent clinical information and reviewed all relevant imaging and diagnostic studies personally.   I counseled the patient about diagnostic testing and treatment plan. All questions were answered.   I discussed recommendations with the primary team.

## 2024-08-28 NOTE — PROGRESS NOTE ADULT - ASSESSMENT
ASSESSMENT  24 year old woman G3A3 with a past medical history of asthma presented for flu like symptoms over the past few days from urgent care with dysuria       IMPRESSION  #UTI - Cystitis  - Urine Cx 7/22/2024 - ESBL E Coli   - was give metronidazole prior to hospitalization at OSH   - Urine Cx 8/24 Staph saphrolyticis, E coli   #Recent COVID infection     #Abx allergy: No Known Allergies    RECOMMENDATIONS  - can give fosfomycin 3g x 1 on discharge (ordered)   - discussed to follow-up with PMD afterwards    Please call or message on [employer] Teams if with any questions.  Spectra [phone]  
24 year old woman G3A3 with a past medical history of asthma presented for flu like symptoms over the past few days from urgent care. She tested Covid positive and was sent to ER. Pt diagnosed with UTI in urgent care. Admitted to medicine for further management     #ESBL E.coli  #Simple UTI  - Patient test positive for ESBL E.coli.   - Still has dysuria and increased urinary frequency after taking microbid  -IV meropenem  - f/u sensitivites    #Mild covid infection  - Not in distress.   -on RA  - Isolation    #Asthma  - On home rescue inhalers    #full code  #DVT prophylaxis: not indicated  
24 year old woman G3A3 with a past medical history of asthma presented for flu like symptoms over the past few days from urgent care. She tested Covid positive and was sent to ER. Pt diagnosed with UTI in urgent care. Admitted to medicine for further management     #ESBL E.coli  #Simple UTI  - Patient test positive for ESBL E.coli.   - Still has dysuria and increased urinary frequency after taking microbid-> no more urinary symptoms   -IV meropenem  - f/u sensitivites    #Mild covid infection  - Not in distress. no symptoms   -on RA  - Isolation    #Asthma  - On home rescue inhalers    #full code  #DVT prophylaxis: not indicated  
24 year old woman G3A3 with a past medical history of asthma presented for flu like symptoms over the past few days from urgent care. She tested Covid positive and was sent to ER. Pt diagnosed with UTI in urgent care. Admitted to medicine for further management.       #ESBL E.coli  #Simple UTI  - hx of ESBL E. coli   - IV meropenem  - f/u sensitivities      #Mild covid infection  - asymptomatic   - on RA  - Isolation    #Asthma  - On home rescue inhalers       #DVT prophylaxis: low risk, encourage ambulation       
ASSESSMENT  24 year old woman G3A3 with a past medical history of asthma presented for flu like symptoms over the past few days from urgent care with dysuria       IMPRESSION  #UTI - Cystitis  - Urine Cx 7/22/2024 - ESBL E Coli   - was give metronidazole prior to hospitalization at OSH   - Urine Cx 8/24 Staph saphrolyticis, E coli   #Recent COVID infection     #Abx allergy: No Known Allergies    RECOMMENDATIONS  - noted urine Cx with E coli and Staph saphrolyticus -- denies dysuria today   - continue meropenem 1g q 8 hours   - can give fosfomycin 3g x 1 on discharge     Please call or message on [employer] Teams if with any questions.  Spectra [phone]

## 2024-09-03 DIAGNOSIS — U07.1 COVID-19: ICD-10-CM

## 2024-09-03 DIAGNOSIS — K92.0 HEMATEMESIS: ICD-10-CM

## 2024-09-03 DIAGNOSIS — Z16.12 EXTENDED SPECTRUM BETA LACTAMASE (ESBL) RESISTANCE: ICD-10-CM

## 2024-09-03 DIAGNOSIS — J45.909 UNSPECIFIED ASTHMA, UNCOMPLICATED: ICD-10-CM

## 2024-09-03 DIAGNOSIS — B96.20 UNSPECIFIED ESCHERICHIA COLI [E. COLI] AS THE CAUSE OF DISEASES CLASSIFIED ELSEWHERE: ICD-10-CM

## 2024-09-03 DIAGNOSIS — N30.90 CYSTITIS, UNSPECIFIED WITHOUT HEMATURIA: ICD-10-CM

## 2024-11-05 ENCOUNTER — APPOINTMENT (OUTPATIENT)
Dept: OBGYN | Facility: CLINIC | Age: 24
End: 2024-11-05
Payer: COMMERCIAL

## 2024-11-05 ENCOUNTER — OUTPATIENT (OUTPATIENT)
Dept: OUTPATIENT SERVICES | Facility: HOSPITAL | Age: 24
LOS: 1 days | End: 2024-11-05
Payer: COMMERCIAL

## 2024-11-05 VITALS
WEIGHT: 153 LBS | SYSTOLIC BLOOD PRESSURE: 122 MMHG | DIASTOLIC BLOOD PRESSURE: 76 MMHG | HEIGHT: 67.5 IN | BODY MASS INDEX: 23.73 KG/M2

## 2024-11-05 DIAGNOSIS — Z01.419 ENCOUNTER FOR GYNECOLOGICAL EXAMINATION (GENERAL) (ROUTINE) WITHOUT ABNORMAL FINDINGS: ICD-10-CM

## 2024-11-05 PROCEDURE — 99212 OFFICE O/P EST SF 10 MIN: CPT

## 2024-11-05 RX ORDER — DESOGESTREL AND ETHINYL ESTRADIOL 0.15-0.03
0.15-3 KIT ORAL DAILY
Qty: 3 | Refills: 0 | Status: ACTIVE | COMMUNITY
Start: 2024-11-05 | End: 1900-01-01

## 2024-11-08 DIAGNOSIS — Z30.09 ENCOUNTER FOR OTHER GENERAL COUNSELING AND ADVICE ON CONTRACEPTION: ICD-10-CM

## 2025-03-17 NOTE — ED PROVIDER NOTE - NS ED MD DISPO SPECIAL CONSIDERATION1
Confirmed COVID-19 Pt mother called, pt has a rash on her back, belly, mouth and hands. Pt has history of hand foot mouth. Rashes started on Saturday afternoon. Pt mother requesting appt w Dr Reddy.